# Patient Record
Sex: FEMALE | Race: WHITE | NOT HISPANIC OR LATINO | Employment: UNEMPLOYED | URBAN - METROPOLITAN AREA
[De-identification: names, ages, dates, MRNs, and addresses within clinical notes are randomized per-mention and may not be internally consistent; named-entity substitution may affect disease eponyms.]

---

## 2017-12-21 ENCOUNTER — HOSPITAL ENCOUNTER (EMERGENCY)
Facility: HOSPITAL | Age: 27
Discharge: HOME | End: 2017-12-21
Attending: EMERGENCY MEDICINE | Admitting: EMERGENCY MEDICINE

## 2017-12-21 VITALS
WEIGHT: 150 LBS | HEIGHT: 63 IN | OXYGEN SATURATION: 99 % | HEART RATE: 129 BPM | RESPIRATION RATE: 18 BRPM | SYSTOLIC BLOOD PRESSURE: 123 MMHG | TEMPERATURE: 98.1 F | BODY MASS INDEX: 26.58 KG/M2 | DIASTOLIC BLOOD PRESSURE: 61 MMHG

## 2017-12-21 DIAGNOSIS — N61.0 ACUTE MASTITIS OF RIGHT BREAST: Primary | ICD-10-CM

## 2017-12-21 PROCEDURE — 99283 EMERGENCY DEPT VISIT LOW MDM: CPT

## 2017-12-21 RX ORDER — CEPHALEXIN 500 MG/1
500 CAPSULE ORAL 4 TIMES DAILY
Qty: 40 CAPSULE | Refills: 0 | Status: SHIPPED | OUTPATIENT
Start: 2017-12-21 | End: 2017-12-31

## 2017-12-21 NOTE — ED PROVIDER NOTES
Subjective   Patient is a 27 y.o. female presenting with general illness.   History provided by:  Patient  Illness   Location:  Right breast   Quality:  Pain, redness, swelling   Duration:  1 day  Chronicity:  New  Context:  11 month old infant, mother breastfeeding   Ineffective treatments:  None   Associated symptoms: fever (subjective  ) and myalgias    No recent antibiotics or hx of mastitis. No drainage from nipple.     Review of Systems   Constitutional: Positive for chills and fever (subjective  ).   Musculoskeletal: Positive for myalgias.   Skin:        Right breast pain / redness   All other systems reviewed and are negative.      Past Medical History:   Diagnosis Date   • Disease of thyroid gland     HYPO        Allergies   Allergen Reactions   • Augmentin [Amoxicillin-Pot Clavulanate]        Past Surgical History:   Procedure Laterality Date   •  SECTION         Family History   Problem Relation Age of Onset   • Cervical cancer Mother        Social History     Social History   • Marital status:      Spouse name: N/A   • Number of children: N/A   • Years of education: N/A     Social History Main Topics   • Smoking status: Never Smoker   • Smokeless tobacco: None   • Alcohol use None      Comment: RARE   • Drug use: None   • Sexual activity: Not Asked     Other Topics Concern   • None     Social History Narrative           Objective   Physical Exam   Constitutional: She appears well-developed and well-nourished.   HENT:   Head: Atraumatic.   Eyes: Conjunctivae are normal.   Neck: Normal range of motion.   Cardiovascular: Regular rhythm.  Tachycardia present.    Pulmonary/Chest: Effort normal. No respiratory distress. Right breast exhibits tenderness. Right breast exhibits no mass and no nipple discharge. There is breast swelling (Erythema right lateral breast  ).   Musculoskeletal: Normal range of motion.   Neurological: She is alert.   Psychiatric: She has a normal mood and affect.   Nursing  "note and vitals reviewed.      Procedures         ED Course  ED Course      Discussed treatment plan with patient. Will Rx Keflex. Patient still breastfeeding. Encouraged continued feedings and warm compresses / expressions.     No results found for this or any previous visit (from the past 24 hour(s)).  Note: In addition to lab results from this visit, the labs listed above may include labs taken at another facility or during a different encounter within the last 24 hours. Please correlate lab times with ED admission and discharge times for further clarification of the services performed during this visit.    No orders to display     Vitals:    12/21/17 1036   BP: 123/61   BP Location: Left arm   Patient Position: Sitting   Pulse: (!) 129   Resp: 18   Temp: 98.1 °F (36.7 °C)   TempSrc: Oral   SpO2: 99%   Weight: 68 kg (150 lb)   Height: 160 cm (63\")     Medications - No data to display                        MDM    Final diagnoses:   Acute mastitis of right breast            ROMULO Jackman  12/21/17 1637    "

## 2018-02-06 ENCOUNTER — OFFICE VISIT (OUTPATIENT)
Dept: FAMILY MEDICINE CLINIC | Facility: CLINIC | Age: 28
End: 2018-02-06

## 2018-02-06 VITALS
HEIGHT: 63 IN | WEIGHT: 153.4 LBS | RESPIRATION RATE: 18 BRPM | DIASTOLIC BLOOD PRESSURE: 80 MMHG | BODY MASS INDEX: 27.18 KG/M2 | OXYGEN SATURATION: 98 % | SYSTOLIC BLOOD PRESSURE: 112 MMHG | HEART RATE: 100 BPM | TEMPERATURE: 97.8 F

## 2018-02-06 DIAGNOSIS — E03.8 HYPOTHYROIDISM DUE TO HASHIMOTO'S THYROIDITIS: Primary | ICD-10-CM

## 2018-02-06 DIAGNOSIS — R73.03 PREDIABETES: ICD-10-CM

## 2018-02-06 DIAGNOSIS — Z76.89 ENCOUNTER TO ESTABLISH CARE: ICD-10-CM

## 2018-02-06 DIAGNOSIS — R53.83 FATIGUE, UNSPECIFIED TYPE: ICD-10-CM

## 2018-02-06 DIAGNOSIS — E06.3 HYPOTHYROIDISM DUE TO HASHIMOTO'S THYROIDITIS: Primary | ICD-10-CM

## 2018-02-06 DIAGNOSIS — Z00.00 HEALTH CARE MAINTENANCE: ICD-10-CM

## 2018-02-06 LAB
ALBUMIN SERPL-MCNC: 4.3 G/DL (ref 3.2–4.8)
ALBUMIN/GLOB SERPL: 2 G/DL (ref 1.5–2.5)
ALP SERPL-CCNC: 82 U/L (ref 25–100)
ALT SERPL W P-5'-P-CCNC: 26 U/L (ref 7–40)
ANION GAP SERPL CALCULATED.3IONS-SCNC: 6 MMOL/L (ref 3–11)
AST SERPL-CCNC: 30 U/L (ref 0–33)
BASOPHILS # BLD AUTO: 0.01 10*3/MM3 (ref 0–0.2)
BASOPHILS NFR BLD AUTO: 0.1 % (ref 0–1)
BILIRUB SERPL-MCNC: 0.2 MG/DL (ref 0.3–1.2)
BUN BLD-MCNC: 15 MG/DL (ref 9–23)
BUN/CREAT SERPL: 30 (ref 7–25)
CALCIUM SPEC-SCNC: 9.3 MG/DL (ref 8.7–10.4)
CHLORIDE SERPL-SCNC: 105 MMOL/L (ref 99–109)
CO2 SERPL-SCNC: 26 MMOL/L (ref 20–31)
CREAT BLD-MCNC: 0.5 MG/DL (ref 0.6–1.3)
DEPRECATED RDW RBC AUTO: 47.1 FL (ref 37–54)
EOSINOPHIL # BLD AUTO: 0.13 10*3/MM3 (ref 0–0.3)
EOSINOPHIL NFR BLD AUTO: 1.9 % (ref 0–3)
ERYTHROCYTE [DISTWIDTH] IN BLOOD BY AUTOMATED COUNT: 14.8 % (ref 11.3–14.5)
GFR SERPL CREATININE-BSD FRML MDRD: 150 ML/MIN/1.73
GLOBULIN UR ELPH-MCNC: 2.1 GM/DL
GLUCOSE BLD-MCNC: 91 MG/DL (ref 70–100)
HBA1C MFR BLD: 5.4 % (ref 4.8–5.6)
HCT VFR BLD AUTO: 38.5 % (ref 34.5–44)
HGB BLD-MCNC: 12.5 G/DL (ref 11.5–15.5)
IMM GRANULOCYTES # BLD: 0.01 10*3/MM3 (ref 0–0.03)
IMM GRANULOCYTES NFR BLD: 0.1 % (ref 0–0.6)
LYMPHOCYTES # BLD AUTO: 2.93 10*3/MM3 (ref 0.6–4.8)
LYMPHOCYTES NFR BLD AUTO: 43.5 % (ref 24–44)
MCH RBC QN AUTO: 28.3 PG (ref 27–31)
MCHC RBC AUTO-ENTMCNC: 32.5 G/DL (ref 32–36)
MCV RBC AUTO: 87.1 FL (ref 80–99)
MONOCYTES # BLD AUTO: 0.77 10*3/MM3 (ref 0–1)
MONOCYTES NFR BLD AUTO: 11.4 % (ref 0–12)
NEUTROPHILS # BLD AUTO: 2.89 10*3/MM3 (ref 1.5–8.3)
NEUTROPHILS NFR BLD AUTO: 43 % (ref 41–71)
PLATELET # BLD AUTO: 211 10*3/MM3 (ref 150–450)
PMV BLD AUTO: 11.8 FL (ref 6–12)
POTASSIUM BLD-SCNC: 3.9 MMOL/L (ref 3.5–5.5)
PROT SERPL-MCNC: 6.4 G/DL (ref 5.7–8.2)
RBC # BLD AUTO: 4.42 10*6/MM3 (ref 3.89–5.14)
SODIUM BLD-SCNC: 137 MMOL/L (ref 132–146)
T4 FREE SERPL-MCNC: 0.89 NG/DL (ref 0.89–1.76)
TSH SERPL DL<=0.05 MIU/L-ACNC: 10.34 MIU/ML (ref 0.35–5.35)
WBC NRBC COR # BLD: 6.74 10*3/MM3 (ref 3.5–10.8)

## 2018-02-06 PROCEDURE — 83036 HEMOGLOBIN GLYCOSYLATED A1C: CPT | Performed by: NURSE PRACTITIONER

## 2018-02-06 PROCEDURE — 80050 GENERAL HEALTH PANEL: CPT | Performed by: NURSE PRACTITIONER

## 2018-02-06 PROCEDURE — 99203 OFFICE O/P NEW LOW 30 MIN: CPT | Performed by: NURSE PRACTITIONER

## 2018-02-06 PROCEDURE — 84439 ASSAY OF FREE THYROXINE: CPT | Performed by: NURSE PRACTITIONER

## 2018-02-06 NOTE — PROGRESS NOTES
"Subjective   Nicole Jose is a 27 y.o. female.   Ms. Jose presents today to establish care and for evaluation/management of her hypothyroidism. Patient states that she has been out of her Levothyroxine for several months and has not had a recent physical exam.    Hypothyroidism   This is a chronic (since age 18) problem. The problem occurs daily. The problem has been gradually worsening. Associated symptoms include fatigue. Pertinent negatives include no abdominal pain, anorexia, arthralgias, chest pain, chills, diaphoresis, fever, headaches, myalgias, neck pain, rash, sore throat or swollen glands. Nothing aggravates the symptoms. Treatments tried: Levothyroxine (previously on 75mcg daily) The treatment provided moderate relief.       The following portions of the patient's history were reviewed and updated as appropriate: allergies, current medications, past family history, past medical history, past social history, past surgical history and problem list.     Allergies   Allergen Reactions   • Augmentin [Amoxicillin-Pot Clavulanate]      Review of Systems   Constitutional: Positive for fatigue. Negative for activity change, appetite change, chills, diaphoresis, fever and unexpected weight change.   HENT: Negative.  Negative for sore throat.    Respiratory: Negative.    Cardiovascular: Positive for palpitations (occasional). Negative for chest pain and leg swelling.   Gastrointestinal: Negative.  Negative for abdominal pain and anorexia.   Genitourinary: Negative.  Menstrual problem: heavy menses.   Musculoskeletal: Negative for arthralgias, myalgias and neck pain.   Skin: Negative for rash.        C/o dry skin, hair \"falling out\".   Allergic/Immunologic: Negative.    Neurological: Negative.  Negative for headaches.   Hematological: Negative.    Psychiatric/Behavioral: Negative.        Objective   Physical Exam   Constitutional: She is oriented to person, place, and time. Vital signs are normal. She appears " well-developed and well-nourished. She is cooperative. No distress.   HENT:   Head: Normocephalic and atraumatic.   Right Ear: Tympanic membrane and external ear normal.   Left Ear: Tympanic membrane and external ear normal.   Nose: Mucosal edema present.   Mouth/Throat: Uvula is midline, oropharynx is clear and moist and mucous membranes are normal.   Neck: Normal range of motion. Neck supple. No thyromegaly present.   Cardiovascular: Normal rate, regular rhythm and normal heart sounds.    Pulmonary/Chest: Effort normal and breath sounds normal.   Lymphadenopathy:     She has no cervical adenopathy.   Neurological: She is alert and oriented to person, place, and time.   Skin: Skin is warm, dry and intact. No rash noted. She is not diaphoretic.   Psychiatric: She has a normal mood and affect. Her behavior is normal. Judgment and thought content normal.   Vitals reviewed.    Vitals:    02/06/18 0842   BP: 112/80   Pulse: 100   Resp: 18   Temp: 97.8 °F (36.6 °C)   SpO2: 98%   Body mass index is 27.17 kg/(m^2).     Assessment/Plan   Nicole was seen today for establish care and hypothyroidism.    Diagnoses and all orders for this visit:    Hypothyroidism due to Hashimoto's thyroiditis  -     TSH  -     T4, Free  -     Comprehensive Metabolic Panel  -     CBC Auto Differential  -     Hemoglobin A1c    Prediabetes  -     TSH  -     T4, Free  -     Comprehensive Metabolic Panel  -     CBC Auto Differential  -     Hemoglobin A1c    Fatigue, unspecified type  -     TSH  -     T4, Free  -     Comprehensive Metabolic Panel  -     CBC Auto Differential  -     Hemoglobin A1c    Encounter to establish care  -     TSH  -     T4, Free  -     Comprehensive Metabolic Panel  -     CBC Auto Differential  -     Hemoglobin A1c    Health care maintenance  -     TSH  -     T4, Free  -     Comprehensive Metabolic Panel  -     CBC Auto Differential  -     Hemoglobin A1c      Discussed the nature of the medical condition(s) risks,  complications, implications, management, safe and proper use of medications. Encouraged medication compliance, and keeping scheduled follow up appointments with me and any other providers.   The patient is here for a health maintenance visit.  Currently, the patient consumes a healthy diet and has an adequate exercise regimen. Screening lab work is ordered.  Immunizations are declined today and vaccine education is provided.  Advice and education is given regarding nutrition, aerobic exercise, routine dental evaluations, routine eye exams, reproductive health, cardiovascular risk reduction, sunscreen use, self skin examination (annual dermatology evaluations) and seat belt use (general overall safety).  Further recommendations after lab evaluation.  Annual wellness evaluations recommended.

## 2018-02-06 NOTE — PATIENT INSTRUCTIONS
Hypothyroidism  Hypothyroidism is a disorder of the thyroid. The thyroid is a large gland that is located in the lower front of the neck. The thyroid releases hormones that control how the body works. With hypothyroidism, the thyroid does not make enough of these hormones.  What are the causes?  Causes of hypothyroidism may include:  · Viral infections.  · Pregnancy.  · Your own defense system (immune system) attacking your thyroid.  · Certain medicines.  · Birth defects.  · Past radiation treatments to your head or neck.  · Past treatment with radioactive iodine.  · Past surgical removal of part or all of your thyroid.  · Problems with the gland that is located in the center of your brain (pituitary).  What are the signs or symptoms?  Signs and symptoms of hypothyroidism may include:  · Feeling as though you have no energy (lethargy).  · Inability to tolerate cold.  · Weight gain that is not explained by a change in diet or exercise habits.  · Dry skin.  · Coarse hair.  · Menstrual irregularity.  · Slowing of thought processes.  · Constipation.  · Sadness or depression.  How is this diagnosed?  Your health care provider may diagnose hypothyroidism with blood tests and ultrasound tests.  How is this treated?  Hypothyroidism is treated with medicine that replaces the hormones that your body does not make. After you begin treatment, it may take several weeks for symptoms to go away.  Follow these instructions at home:  · Take medicines only as directed by your health care provider.  · If you start taking any new medicines, tell your health care provider.  · Keep all follow-up visits as directed by your health care provider. This is important. As your condition improves, your dosage needs may change. You will need to have blood tests regularly so that your health care provider can watch your condition.  Contact a health care provider if:  · Your symptoms do not get better with treatment.  · You are taking thyroid  replacement medicine and:  ¨ You sweat excessively.  ¨ You have tremors.  ¨ You feel anxious.  ¨ You lose weight rapidly.  ¨ You cannot tolerate heat.  ¨ You have emotional swings.  ¨ You have diarrhea.  ¨ You feel weak.  Get help right away if:  · You develop chest pain.  · You develop an irregular heartbeat.  · You develop a rapid heartbeat.  This information is not intended to replace advice given to you by your health care provider. Make sure you discuss any questions you have with your health care provider.  Document Released: 12/18/2006 Document Revised: 05/25/2017 Document Reviewed: 05/05/2015  [employer] Interactive Patient Education © 2017 [employer]    Fatigue  Introduction  Fatigue is feeling tired all of the time, a lack of energy, or a lack of motivation. Occasional or mild fatigue is often a normal response to activity or life in general. However, long-lasting (chronic) or extreme fatigue may indicate an underlying medical condition.  Follow these instructions at home:  Watch your fatigue for any changes. The following actions may help to lessen any discomfort you are feeling:  · Talk to your health care provider about how much sleep you need each night. Try to get the required amount every night.  · Take medicines only as directed by your health care provider.  · Eat a healthy and nutritious diet. Ask your health care provider if you need help changing your diet.  · Drink enough fluid to keep your urine clear or pale yellow.  · Practice ways of relaxing, such as yoga, meditation, massage therapy, or acupuncture.  · Exercise regularly.  · Change situations that cause you stress. Try to keep your work and personal routine reasonable.  · Do not abuse illegal drugs.  · Limit alcohol intake to no more than 1 drink per day for nonpregnant women and 2 drinks per day for men. One drink equals 12 ounces of beer, 5 ounces of wine, or 1½ ounces of hard liquor.  · Take a multivitamin, if directed by your health  care provider.  Contact a health care provider if:  · Your fatigue does not get better.  · You have a fever.  · You have unintentional weight loss or gain.  · You have headaches.  · You have difficulty:  ¨ Falling asleep.  ¨ Sleeping throughout the night.  · You feel angry, guilty, anxious, or sad.  · You are unable to have a bowel movement (constipation).  · You skin is dry.  · Your legs or another part of your body is swollen.  Get help right away if:  · You feel confused.  · Your vision is blurry.  · You feel faint or pass out.  · You have a severe headache.  · You have severe abdominal, pelvic, or back pain.  · You have chest pain, shortness of breath, or an irregular or fast heartbeat.  · You are unable to urinate or you urinate less than normal.  · You develop abnormal bleeding, such as bleeding from the rectum, vagina, nose, lungs, or nipples.  · You vomit blood.  · You have thoughts about harming yourself or committing suicide.  · You are worried that you might harm someone else.  This information is not intended to replace advice given to you by your health care provider. Make sure you discuss any questions you have with your health care provider.  Document Released: 10/14/2008 Document Revised: 05/25/2017 Document Reviewed: 04/21/2015  © 2017 [employer]    Hyperglycemia  Hyperglycemia occurs when the level of sugar (glucose) in the blood is too high. Glucose is a type of sugar that provides the body's main source of energy. Certain hormones (insulin and glucagon) control the level of glucose in the blood. Insulin lowers blood glucose, and glucagon increases blood glucose. Hyperglycemia can result from having too little insulin in the bloodstream, or from the body not responding normally to insulin.  Hyperglycemia occurs most often in people who have diabetes (diabetes mellitus), but it can happen in people who do not have diabetes. It can develop quickly, and it can be life-threatening if it causes you to  become severely dehydrated (diabetic ketoacidosis or hyperglycemic hyperosmolar state). Severe hyperglycemia is a medical emergency.  What are the causes?  If you have diabetes, hyperglycemia may be caused by:  · Diabetes medicine.  · Medicines that increase blood glucose or affect your diabetes control.  · Not eating enough, or not eating often enough.  · Changes in physical activity level.  · Being sick or having an infection.  If you have prediabetes or undiagnosed diabetes:  · Hyperglycemia may be caused by those conditions.  If you do not have diabetes, hyperglycemia may be caused by:  · Certain medicines, including steroid medicines, beta-blockers, epinephrine, and thiazide diuretics.  · Stress.  · Serious illness.  · Surgery.  · Diseases of the pancreas.  · Infection.  What increases the risk?  Hyperglycemia is more likely to develop in people who have risk factors for diabetes, such as:  · Having a family member with diabetes.  · Having a gene for type 1 diabetes that is passed from parent to child (inherited).  · Living in an area with cold weather conditions.  · Exposure to certain viruses.  · Certain conditions in which the body's disease-fighting (immune) system attacks itself (autoimmune disorders).  · Being overweight or obese.  · Having an inactive (sedentary) lifestyle.  · Having been diagnosed with insulin resistance.  · Having a history of prediabetes, gestational diabetes, or polycystic ovarian syndrome (PCOS).  · Being of American-[place], -American, /, or / descent.  What are the signs or symptoms?  Hyperglycemia may not cause any symptoms. If you do have symptoms, they may include early warning signs, such as:  · Increased thirst.  · Hunger.  · Feeling very tired.  · Needing to urinate more often than usual.  · Blurry vision.  Other symptoms may develop if hyperglycemia gets worse, such as:  · Dry mouth.  · Loss of appetite.  · Fruity-smelling  breath.  · Weakness.  · Unexpected or rapid weight gain or weight loss.  · Tingling or numbness in the hands or feet.  · Headache.  · Skin that does not quickly return to normal after being lightly pinched and released (poor skin turgor).  · Abdominal pain.  · Cuts or bruises that are slow to heal.  How is this diagnosed?  Hyperglycemia is diagnosed with a blood test to measure your blood glucose level. This blood test is usually done while you are having symptoms. Your health care provider may also do a physical exam and review your medical history.  You may have more tests to determine the cause of your hyperglycemia, such as:  · A fasting blood glucose (FBG) test. You will not be allowed to eat (you will fast) for at least 8 hours before a blood sample is taken.  · An A1c (hemoglobin A1c) blood test. This provides information about blood glucose control over the previous 2-3 months.  · An oral glucose tolerance test (OGTT). This measures your blood glucose at two times:  ¨ After fasting. This is your baseline blood glucose level.  ¨ Two hours after drinking a beverage that contains glucose.  How is this treated?  Treatment depends on the cause of your hyperglycemia. Treatment may include:  · Taking medicine to regulate your blood glucose levels. If you take insulin or other diabetes medicines, your medicine or dosage may be adjusted.  · Lifestyle changes, such as exercising more, eating healthier foods, or losing weight.  · Treating an illness or infection, if this caused your hyperglycemia.  · Checking your blood glucose more often.  · Stopping or reducing steroid medicines, if these caused your hyperglycemia.  If your hyperglycemia becomes severe and it results in hyperglycemic hyperosmolar state, you must be hospitalized and given IV fluids.  Follow these instructions at home:  General instructions  · Take over-the-counter and prescription medicines only as told by your health care provider.  · Do not use any  products that contain nicotine or tobacco, such as cigarettes and e-cigarettes. If you need help quitting, ask your health care provider.  · Limit alcohol intake to no more than 1 drink per day for nonpregnant women and 2 drinks per day for men. One drink equals 12 oz of beer, 5 oz of wine, or 1½ oz of hard liquor.  · Learn to manage stress. If you need help with this, ask your health care provider.  · Keep all follow-up visits as told by your health care provider. This is important.  Eating and drinking  · Maintain a healthy weight.  · Exercise regularly, as directed by your health care provider.  · Stay hydrated, especially when you exercise, get sick, or spend time in hot temperatures.  · Eat healthy foods, such as:  ¨ Lean proteins.  ¨ Complex carbohydrates.  ¨ Fresh fruits and vegetables.  ¨ Low-fat dairy products.  ¨ Healthy fats.  · Drink enough fluid to keep your urine clear or pale yellow.  If you have diabetes:   · Make sure you know the symptoms of hyperglycemia.  · Follow your diabetes management plan, as told by your health care provider. Make sure you:  ¨ Take your insulin and medicines as directed.  ¨ Follow your exercise plan.  ¨ Follow your meal plan. Eat on time, and do not skip meals.  ¨ Check your blood glucose as often as directed. Make sure to check your blood glucose before and after exercise. If you exercise longer or in a different way than usual, check your blood glucose more often.  ¨ Follow your sick day plan whenever you cannot eat or drink normally. Make this plan in advance with your health care provider.  · Share your diabetes management plan with people in your workplace, school, and household.  · Check your urine for ketones when you are ill and as told by your health care provider.  · Carry a medical alert card or wear medical alert jewelry.  Contact a health care provider if:  · Your blood glucose is at or above 240 mg/dL (13.3 mmol/L) for 2 days in a row.  · You have problems  keeping your blood glucose in your target range.  · You have frequent episodes of hyperglycemia.  Get help right away if:  · You have difficulty breathing.  · You have a change in how you think, feel, or act (mental status).  · You have nausea or vomiting that does not go away.  These symptoms may represent a serious problem that is an emergency. Do not wait to see if the symptoms will go away. Get medical help right away. Call your local emergency services (911 in the [place] Do not drive yourself to the hospital.   Summary  · Hyperglycemia occurs when the level of sugar (glucose) in the blood is too high.  · Hyperglycemia is diagnosed with a blood test to measure your blood glucose level. This blood test is usually done while you are having symptoms. Your health care provider may also do a physical exam and review your medical history.  · If you have diabetes, follow your diabetes management plan as told by your health care provider.  · Contact your health care provider if you have problems keeping your blood glucose in your target range.  This information is not intended to replace advice given to you by your health care provider. Make sure you discuss any questions you have with your health care provider.  Document Released: 06/13/2002 Document Revised: 09/04/2017 Document Reviewed: 09/04/2017  [employer] Interactive Patient Education © 2017 [employer]  Levothyroxine tablets  What is this medicine?  LEVOTHYROXINE (moisés voe thye ROBER een) is a thyroid hormone. This medicine can improve symptoms of thyroid deficiency such as slow speech, lack of energy, weight gain, hair loss, dry skin, and feeling cold. It also helps to treat goiter (an enlarged thyroid gland). It is also used to treat some kinds of thyroid cancer along with surgery and other medicines.  This medicine may be used for other purposes; ask your health care provider or pharmacist if you have questions.  COMMON BRAND NAME(S): Estre, Levo-T, Levothroid,  Levoxyl, Synthroid, Thyro-Tabs, Unithroid  What should I tell my health care provider before I take this medicine?  They need to know if you have any of these conditions:  -angina  -blood clotting problems  -diabetes  -dieting or on a weight loss program  -fertility problems  -heart disease  -high levels of thyroid hormone  -pituitary gland problem  -previous heart attack  -an unusual or allergic reaction to levothyroxine, thyroid hormones, other medicines, foods, dyes, or preservatives  -pregnant or trying to get pregnant  -breast-feeding  How should I use this medicine?  Take this medicine by mouth with plenty of water. It is best to take on an empty stomach, at least 30 minutes before or 2 hours after food. Follow the directions on the prescription label. Take at the same time each day. Do not take your medicine more often than directed.  Contact your pediatrician regarding the use of this medicine in children. While this drug may be prescribed for children and infants as young as a few days of age for selected conditions, precautions do apply. For infants, you may crush the tablet and place in a small amount of (5-10 ml or 1 to 2 teaspoonfuls) of water, breast milk, or non-soy based infant formula. Do not mix with soy-based infant formula. Give as directed.  Overdosage: If you think you have taken too much of this medicine contact a poison control center or emergency room at once.  NOTE: This medicine is only for you. Do not share this medicine with others.  What if I miss a dose?  If you miss a dose, take it as soon as you can. If it is almost time for your next dose, take only that dose. Do not take double or extra doses.  What may interact with this medicine?  -amiodarone  -antacids  -anti-thyroid medicines  -calcium supplements  -carbamazepine  -cholestyramine  -colestipol  -digoxin  -female hormones, including contraceptive or birth control pills  -iron supplements  -ketamine  -liquid nutrition products  like Ensure  -medicines for colds and breathing difficulties  -medicines for diabetes  -medicines for mental depression  -medicines or herbals used to decrease weight or appetite  -phenobarbital or other barbiturate medications  -phenytoin  -prednisone or other corticosteroids  -rifabutin  -rifampin  -soy isoflavones  -sucralfate  -theophylline  -warfarin  This list may not describe all possible interactions. Give your health care provider a list of all the medicines, herbs, non-prescription drugs, or dietary supplements you use. Also tell them if you smoke, drink alcohol, or use illegal drugs. Some items may interact with your medicine.  What should I watch for while using this medicine?  Be sure to take this medicine with plenty of fluids. Some tablets may cause choking, gagging, or difficulty swallowing from the tablet getting stuck in your throat. Most of these problems disappear if the medicine is taken with the right amount of water or other fluids.  Do not switch brands of this medicine unless your health care professional agrees with the change. Ask questions if you are uncertain.  You will need regular exams and occasional blood tests to check the response to treatment. If you are receiving this medicine for an underactive thyroid, it may be several weeks before you notice an improvement. Check with your doctor or health care professional if your symptoms do not improve.  It may be necessary for you to take this medicine for the rest of your life. Do not stop using this medicine unless your doctor or health care professional advises you to.  This medicine can affect blood sugar levels. If you have diabetes, check your blood sugar as directed.  You may lose some of your hair when you first start treatment. With time, this usually corrects itself.  If you are going to have surgery, tell your doctor or health care professional that you are taking this medicine.  What side effects may I notice from receiving  this medicine?  Side effects that you should report to your doctor or health care professional as soon as possible:  -allergic reactions like skin rash, itching or hives, swelling of the face, lips, or tongue  -chest pain  -excessive sweating or intolerance to heat  -fast or irregular heartbeat  -nervousness  -skin rash or hives  -swelling of ankles, feet, or legs  -tremors  Side effects that usually do not require medical attention (report to your doctor or health care professional if they continue or are bothersome):  -changes in appetite  -changes in menstrual periods  -diarrhea  -hair loss  -headache  -trouble sleeping  -weight loss  This list may not describe all possible side effects. Call your doctor for medical advice about side effects. You may report side effects to FDA at 6-407-FDA-7174.  Where should I keep my medicine?  Keep out of the reach of children.  Store at room temperature between 15 and 30 degrees C (59 and 86 degrees F). Protect from light and moisture. Keep container tightly closed. Throw away any unused medicine after the expiration date.  NOTE: This sheet is a summary. It may not cover all possible information. If you have questions about this medicine, talk to your doctor, pharmacist, or health care provider.  © 2018 [employer] (2010-03-26 14:28:07)  Health Maintenance, Female  Introduction  Adopting a healthy lifestyle and getting preventive care can go a long way to promote health and wellness. Talk with your health care provider about what schedule of regular examinations is right for you. This is a good chance for you to check in with your provider about disease prevention and staying healthy.  In between checkups, there are plenty of things you can do on your own. Experts have done a lot of research about which lifestyle changes and preventive measures are most likely to keep you healthy. Ask your health care provider for more information.  Weight and diet  Eat a healthy  diet  · Be sure to include plenty of vegetables, fruits, low-fat dairy products, and lean protein.  · Do not eat a lot of foods high in solid fats, added sugars, or salt.  · Get regular exercise. This is one of the most important things you can do for your health.  ¨ Most adults should exercise for at least 150 minutes each week. The exercise should increase your heart rate and make you sweat (moderate-intensity exercise).  ¨ Most adults should also do strengthening exercises at least twice a week. This is in addition to the moderate-intensity exercise.  Maintain a healthy weight  · Body mass index (BMI) is a measurement that can be used to identify possible weight problems. It estimates body fat based on height and weight. Your health care provider can help determine your BMI and help you achieve or maintain a healthy weight.  · For females 20 years of age and older:  ¨ A BMI below 18.5 is considered underweight.  ¨ A BMI of 18.5 to 24.9 is normal.  ¨ A BMI of 25 to 29.9 is considered overweight.  ¨ A BMI of 30 and above is considered obese.  Watch levels of cholesterol and blood lipids  · You should start having your blood tested for lipids and cholesterol at 20 years of age, then have this test every 5 years.  · You may need to have your cholesterol levels checked more often if:  ¨ Your lipid or cholesterol levels are high.  ¨ You are older than 50 years of age.  ¨ You are at high risk for heart disease.  Cancer screening  Lung Cancer  · Lung cancer screening is recommended for adults 55-80 years old who are at high risk for lung cancer because of a history of smoking.  · A yearly low-dose CT scan of the lungs is recommended for people who:  ¨ Currently smoke.  ¨ Have quit within the past 15 years.  ¨ Have at least a 30-pack-year history of smoking. A pack year is smoking an average of one pack of cigarettes a day for 1 year.  · Yearly screening should continue until it has been 15 years since you quit.  · Yearly  screening should stop if you develop a health problem that would prevent you from having lung cancer treatment.  Breast Cancer  · Practice breast self-awareness. This means understanding how your breasts normally appear and feel.  · It also means doing regular breast self-exams. Let your health care provider know about any changes, no matter how small.  · If you are in your 20s or 30s, you should have a clinical breast exam (CBE) by a health care provider every 1-3 years as part of a regular health exam.  · If you are 40 or older, have a CBE every year. Also consider having a breast X-ray (mammogram) every year.  · If you have a family history of breast cancer, talk to your health care provider about genetic screening.  · If you are at high risk for breast cancer, talk to your health care provider about having an MRI and a mammogram every year.  · Breast cancer gene (BRCA) assessment is recommended for women who have family members with BRCA-related cancers. BRCA-related cancers include:  ¨ Breast.  ¨ Ovarian.  ¨ Tubal.  ¨ Peritoneal cancers.  · Results of the assessment will determine the need for genetic counseling and BRCA1 and BRCA2 testing.  Cervical Cancer   Your health care provider may recommend that you be screened regularly for cancer of the pelvic organs (ovaries, uterus, and vagina). This screening involves a pelvic examination, including checking for microscopic changes to the surface of your cervix (Pap test). You may be encouraged to have this screening done every 3 years, beginning at age 21.  · For women ages 30-65, health care providers may recommend pelvic exams and Pap testing every 3 years, or they may recommend the Pap and pelvic exam, combined with testing for human papilloma virus (HPV), every 5 years. Some types of HPV increase your risk of cervical cancer. Testing for HPV may also be done on women of any age with unclear Pap test results.  · Other health care providers may not recommend any  screening for nonpregnant women who are considered low risk for pelvic cancer and who do not have symptoms. Ask your health care provider if a screening pelvic exam is right for you.  · If you have had past treatment for cervical cancer or a condition that could lead to cancer, you need Pap tests and screening for cancer for at least 20 years after your treatment. If Pap tests have been discontinued, your risk factors (such as having a new sexual partner) need to be reassessed to determine if screening should resume. Some women have medical problems that increase the chance of getting cervical cancer. In these cases, your health care provider may recommend more frequent screening and Pap tests.  Colorectal Cancer  · This type of cancer can be detected and often prevented.  · Routine colorectal cancer screening usually begins at 50 years of age and continues through 75 years of age.  · Your health care provider may recommend screening at an earlier age if you have risk factors for colon cancer.  · Your health care provider may also recommend using home test kits to check for hidden blood in the stool.  · A small camera at the end of a tube can be used to examine your colon directly (sigmoidoscopy or colonoscopy). This is done to check for the earliest forms of colorectal cancer.  · Routine screening usually begins at age 50.  · Direct examination of the colon should be repeated every 5-10 years through 75 years of age. However, you may need to be screened more often if early forms of precancerous polyps or small growths are found.  Skin Cancer  · Check your skin from head to toe regularly.  · Tell your health care provider about any new moles or changes in moles, especially if there is a change in a mole's shape or color.  · Also tell your health care provider if you have a mole that is larger than the size of a pencil eraser.  · Always use sunscreen. Apply sunscreen liberally and repeatedly throughout the  day.  · Protect yourself by wearing long sleeves, pants, a wide-brimmed hat, and sunglasses whenever you are outside.  Heart disease, diabetes, and high blood pressure  · High blood pressure causes heart disease and increases the risk of stroke. High blood pressure is more likely to develop in:  ¨ People who have blood pressure in the high end of the normal range (130-139/85-89 mm Hg).  ¨ People who are overweight or obese.  ¨ People who are .  · If you are 18-39 years of age, have your blood pressure checked every 3-5 years. If you are 40 years of age or older, have your blood pressure checked every year. You should have your blood pressure measured twice--once when you are at a hospital or clinic, and once when you are not at a hospital or clinic. Record the average of the two measurements. To check your blood pressure when you are not at a hospital or clinic, you can use:  ¨ An automated blood pressure machine at a pharmacy.  ¨ A home blood pressure monitor.  · If you are between 55 years and 79 years old, ask your health care provider if you should take aspirin to prevent strokes.  · Have regular diabetes screenings. This involves taking a blood sample to check your fasting blood sugar level.  ¨ If you are at a normal weight and have a low risk for diabetes, have this test once every three years after 45 years of age.  ¨ If you are overweight and have a high risk for diabetes, consider being tested at a younger age or more often.  Preventing infection  Hepatitis B  · If you have a higher risk for hepatitis B, you should be screened for this virus. You are considered at high risk for hepatitis B if:  ¨ You were born in a country where hepatitis B is common. Ask your health care provider which countries are considered high risk.  ¨ Your parents were born in a high-risk country, and you have not been immunized against hepatitis B (hepatitis B vaccine).  ¨ You have HIV or AIDS.  ¨ You use needles to  inject street drugs.  ¨ You live with someone who has hepatitis B.  ¨ You have had sex with someone who has hepatitis B.  ¨ You get hemodialysis treatment.  ¨ You take certain medicines for conditions, including cancer, organ transplantation, and autoimmune conditions.  Hepatitis C  · Blood testing is recommended for:  ¨ Everyone born from 1945 through 1965.  ¨ Anyone with known risk factors for hepatitis C.  Sexually transmitted infections (STIs)  · You should be screened for sexually transmitted infections (STIs) including gonorrhea and chlamydia if:  ¨ You are sexually active and are younger than 24 years of age.  ¨ You are older than 24 years of age and your health care provider tells you that you are at risk for this type of infection.  ¨ Your sexual activity has changed since you were last screened and you are at an increased risk for chlamydia or gonorrhea. Ask your health care provider if you are at risk.  · If you do not have HIV, but are at risk, it may be recommended that you take a prescription medicine daily to prevent HIV infection. This is called pre-exposure prophylaxis (PrEP). You are considered at risk if:  ¨ You are sexually active and do not regularly use condoms or know the HIV status of your partner(s).  ¨ You take drugs by injection.  ¨ You are sexually active with a partner who has HIV.  Talk with your health care provider about whether you are at high risk of being infected with HIV. If you choose to begin PrEP, you should first be tested for HIV. You should then be tested every 3 months for as long as you are taking PrEP.  Pregnancy  · If you are premenopausal and you may become pregnant, ask your health care provider about preconception counseling.  · If you may become pregnant, take 400 to 800 micrograms (mcg) of folic acid every day.  · If you want to prevent pregnancy, talk to your health care provider about birth control (contraception).  Osteoporosis and menopause  · Osteoporosis is a  disease in which the bones lose minerals and strength with aging. This can result in serious bone fractures. Your risk for osteoporosis can be identified using a bone density scan.  · If you are 65 years of age or older, or if you are at risk for osteoporosis and fractures, ask your health care provider if you should be screened.  · Ask your health care provider whether you should take a calcium or vitamin D supplement to lower your risk for osteoporosis.  · Menopause may have certain physical symptoms and risks.  · Hormone replacement therapy may reduce some of these symptoms and risks.  Talk to your health care provider about whether hormone replacement therapy is right for you.  Follow these instructions at home:  · Schedule regular health, dental, and eye exams.  · Stay current with your immunizations.  · Do not use any tobacco products including cigarettes, chewing tobacco, or electronic cigarettes.  · If you are pregnant, do not drink alcohol.  · If you are breastfeeding, limit how much and how often you drink alcohol.  · Limit alcohol intake to no more than 1 drink per day for nonpregnant women. One drink equals 12 ounces of beer, 5 ounces of wine, or 1½ ounces of hard liquor.  · Do not use street drugs.  · Do not share needles.  · Ask your health care provider for help if you need support or information about quitting drugs.  · Tell your health care provider if you often feel depressed.  · Tell your health care provider if you have ever been abused or do not feel safe at home.  This information is not intended to replace advice given to you by your health care provider. Make sure you discuss any questions you have with your health care provider.  Document Released: 07/02/2012 Document Revised: 05/25/2017 Document Reviewed: 09/20/2016  © 2017 [employer]    Preventive Care 18-39 Years, Female  Preventive care refers to lifestyle choices and visits with your health care provider that can promote health and  wellness.  What does preventive care include?  · A yearly physical exam. This is also called an annual well check.  · Dental exams once or twice a year.  · Routine eye exams. Ask your health care provider how often you should have your eyes checked.  · Personal lifestyle choices, including:  ¨ Daily care of your teeth and gums.  ¨ Regular physical activity.  ¨ Eating a healthy diet.  ¨ Avoiding tobacco and drug use.  ¨ Limiting alcohol use.  ¨ Practicing safe sex.  ¨ Taking vitamin and mineral supplements as recommended by your health care provider.  What happens during an annual well check?  The services and screenings done by your health care provider during your annual well check will depend on your age, overall health, lifestyle risk factors, and family history of disease.  Counseling   Your health care provider may ask you questions about your:  · Alcohol use.  · Tobacco use.  · Drug use.  · Emotional well-being.  · Home and relationship well-being.  · Sexual activity.  · Eating habits.  · Work and work environment.  · Method of birth control.  · Menstrual cycle.  · Pregnancy history.  Screening   You may have the following tests or measurements:  · Height, weight, and BMI.  · Diabetes screening. This is done by checking your blood sugar (glucose) after you have not eaten for a while (fasting).  · Blood pressure.  · Lipid and cholesterol levels. These may be checked every 5 years starting at age 20.  · Skin check.  · Hepatitis C blood test.  · Hepatitis B blood test.  · Sexually transmitted disease (STD) testing.  · BRCA-related cancer screening. This may be done if you have a family history of breast, ovarian, tubal, or peritoneal cancers.  · Pelvic exam and Pap test. This may be done every 3 years starting at age 21. Starting at age 30, this may be done every 5 years if you have a Pap test in combination with an HPV test.  Discuss your test results, treatment options, and if necessary, the need for more tests  with your health care provider.  Vaccines   Your health care provider may recommend certain vaccines, such as:  · Influenza vaccine. This is recommended every year.  · Tetanus, diphtheria, and acellular pertussis (Tdap, Td) vaccine. You may need a Td booster every 10 years.  · Varicella vaccine. You may need this if you have not been vaccinated.  · HPV vaccine. If you are 26 or younger, you may need three doses over 6 months.  · Measles, mumps, and rubella (MMR) vaccine. You may need at least one dose of MMR. You may also need a second dose.  · Pneumococcal 13-valent conjugate (PCV13) vaccine. You may need this if you have certain conditions and were not previously vaccinated.  · Pneumococcal polysaccharide (PPSV23) vaccine. You may need one or two doses if you smoke cigarettes or if you have certain conditions.  · Meningococcal vaccine. One dose is recommended if you are age 19-21 years and a first-year college student living in a [place], or if you have one of several medical conditions. You may also need additional booster doses.  · Hepatitis A vaccine. You may need this if you have certain conditions or if you travel or work in places where you may be exposed to hepatitis A.  · Hepatitis B vaccine. You may need this if you have certain conditions or if you travel or work in places where you may be exposed to hepatitis B.  · Haemophilus influenzae type b (Hib) vaccine. You may need this if you have certain risk factors.  Talk to your health care provider about which screenings and vaccines you need and how often you need them.  This information is not intended to replace advice given to you by your health care provider. Make sure you discuss any questions you have with your health care provider.  Document Released: 02/13/2003 Document Revised: 09/06/2017 Document Reviewed: 10/18/2016  [employer] Interactive Patient Education © 2017 [employer]

## 2018-02-11 DIAGNOSIS — E03.8 HYPOTHYROIDISM DUE TO HASHIMOTO'S THYROIDITIS: Primary | ICD-10-CM

## 2018-02-11 DIAGNOSIS — E06.3 HYPOTHYROIDISM DUE TO HASHIMOTO'S THYROIDITIS: Primary | ICD-10-CM

## 2018-02-11 RX ORDER — LEVOTHYROXINE SODIUM 0.07 MG/1
75 TABLET ORAL DAILY
Qty: 30 TABLET | Refills: 1 | Status: SHIPPED | OUTPATIENT
Start: 2018-02-11 | End: 2018-08-02 | Stop reason: SDUPTHER

## 2018-08-02 ENCOUNTER — OFFICE VISIT (OUTPATIENT)
Dept: FAMILY MEDICINE CLINIC | Facility: CLINIC | Age: 28
End: 2018-08-02

## 2018-08-02 VITALS
WEIGHT: 153 LBS | BODY MASS INDEX: 27.11 KG/M2 | HEART RATE: 90 BPM | HEIGHT: 63 IN | SYSTOLIC BLOOD PRESSURE: 98 MMHG | TEMPERATURE: 98.2 F | DIASTOLIC BLOOD PRESSURE: 66 MMHG | OXYGEN SATURATION: 97 % | RESPIRATION RATE: 18 BRPM

## 2018-08-02 DIAGNOSIS — E06.3 HYPOTHYROIDISM DUE TO HASHIMOTO'S THYROIDITIS: Primary | ICD-10-CM

## 2018-08-02 DIAGNOSIS — E03.8 HYPOTHYROIDISM DUE TO HASHIMOTO'S THYROIDITIS: Primary | ICD-10-CM

## 2018-08-02 LAB — TSH SERPL DL<=0.05 MIU/L-ACNC: 7.96 MIU/ML (ref 0.35–5.35)

## 2018-08-02 PROCEDURE — 99214 OFFICE O/P EST MOD 30 MIN: CPT | Performed by: NURSE PRACTITIONER

## 2018-08-02 PROCEDURE — 84443 ASSAY THYROID STIM HORMONE: CPT | Performed by: NURSE PRACTITIONER

## 2018-08-02 RX ORDER — LEVOTHYROXINE SODIUM 0.07 MG/1
75 TABLET ORAL DAILY
Qty: 30 TABLET | Refills: 1 | Status: SHIPPED | OUTPATIENT
Start: 2018-08-02 | End: 2018-10-02 | Stop reason: SDUPTHER

## 2018-08-02 NOTE — PROGRESS NOTES
Subjective   Nicole Jose is a 27 y.o. female.   Ms. Jose presents today for re-evaluation/management of hypothyroidism. Patient states that she has recently returned from a trip to [place] and while she was away she ran out of her thyroid medication. States has been out of her medication for 2 months-c/o her hair falling out now.    Hypothyroidism   This is a chronic problem. The current episode started more than 1 year ago. The problem occurs daily. The problem has been gradually worsening. Associated symptoms include fatigue. Pertinent negatives include no abdominal pain, anorexia, arthralgias, chest pain, chills, diaphoresis, fever, headaches, myalgias, nausea, neck pain, rash, sore throat, swollen glands or vomiting. Nothing aggravates the symptoms. Treatments tried: Levothyroxine (previously on 75mcg daily) The treatment provided moderate relief.       The following portions of the patient's history were reviewed and updated as appropriate: allergies, current medications, past family history, past medical history, past social history, past surgical history and problem list.     Allergies   Allergen Reactions   • Augmentin [Amoxicillin-Pot Clavulanate]      Current Outpatient Prescriptions:   •  levothyroxine (SYNTHROID) 75 MCG tablet, Take 1 tablet by mouth Daily., Disp: 30 tablet, Rfl: 1     Review of Systems   Constitutional: Positive for fatigue. Negative for activity change, appetite change, chills, diaphoresis and fever.   HENT: Negative.  Negative for sore throat.    Respiratory: Negative.    Cardiovascular: Negative for chest pain.   Gastrointestinal: Negative for abdominal pain, anorexia, diarrhea, nausea and vomiting.   Genitourinary: Negative.    Musculoskeletal: Negative for arthralgias, myalgias and neck pain.   Skin: Negative for rash.   Neurological: Negative.  Negative for dizziness and headaches.       Objective   Physical Exam   Constitutional: She is oriented to person, place, and time.  Vital signs are normal. She appears well-developed and well-nourished. She is cooperative. No distress.   HENT:   Mouth/Throat: Uvula is midline and mucous membranes are normal.   Neck: Normal range of motion. Neck supple. No thyromegaly present.   Cardiovascular: Normal rate, regular rhythm and normal heart sounds.    Pulmonary/Chest: Effort normal and breath sounds normal.   Lymphadenopathy:     She has no cervical adenopathy.   Neurological: She is alert and oriented to person, place, and time.   Skin: Skin is warm, dry and intact. No rash noted. She is not diaphoretic.   Psychiatric: She has a normal mood and affect. Her behavior is normal. Judgment and thought content normal.   Vitals reviewed.    Vitals:    08/02/18 0740   BP: 98/66   Pulse: 90   Resp: 18   Temp: 98.2 °F (36.8 °C)   SpO2: 97%   Body mass index is 27.1 kg/m².     Assessment/Plan   Diagnoses and all orders for this visit:    Hypothyroidism due to Hashimoto's thyroiditis  -     TSH       Discussed the nature of the medical condition(s) risks, complications, implications, management, safe and proper use of medications. Encouraged medication compliance, and keeping scheduled follow up appointments with me and any other providers.   Laboratory testing ordered today. Further recommendations after lab evaluation.

## 2018-08-02 NOTE — PATIENT INSTRUCTIONS
Hypothyroidism  Hypothyroidism is a disorder of the thyroid. The thyroid is a large gland that is located in the lower front of the neck. The thyroid releases hormones that control how the body works. With hypothyroidism, the thyroid does not make enough of these hormones.  What are the causes?  Causes of hypothyroidism may include:  · Viral infections.  · Pregnancy.  · Your own defense system (immune system) attacking your thyroid.  · Certain medicines.  · Birth defects.  · Past radiation treatments to your head or neck.  · Past treatment with radioactive iodine.  · Past surgical removal of part or all of your thyroid.  · Problems with the gland that is located in the center of your brain (pituitary).    What are the signs or symptoms?  Signs and symptoms of hypothyroidism may include:  · Feeling as though you have no energy (lethargy).  · Inability to tolerate cold.  · Weight gain that is not explained by a change in diet or exercise habits.  · Dry skin.  · Coarse hair.  · Menstrual irregularity.  · Slowing of thought processes.  · Constipation.  · Sadness or depression.    How is this diagnosed?  Your health care provider may diagnose hypothyroidism with blood tests and ultrasound tests.  How is this treated?  Hypothyroidism is treated with medicine that replaces the hormones that your body does not make. After you begin treatment, it may take several weeks for symptoms to go away.  Follow these instructions at home:  · Take medicines only as directed by your health care provider.  · If you start taking any new medicines, tell your health care provider.  · Keep all follow-up visits as directed by your health care provider. This is important. As your condition improves, your dosage needs may change. You will need to have blood tests regularly so that your health care provider can watch your condition.  Contact a health care provider if:  · Your symptoms do not get better with treatment.  · You are taking thyroid  replacement medicine and:  ? You sweat excessively.  ? You have tremors.  ? You feel anxious.  ? You lose weight rapidly.  ? You cannot tolerate heat.  ? You have emotional swings.  ? You have diarrhea.  ? You feel weak.  Get help right away if:  · You develop chest pain.  · You develop an irregular heartbeat.  · You develop a rapid heartbeat.  This information is not intended to replace advice given to you by your health care provider. Make sure you discuss any questions you have with your health care provider.  Document Released: 12/18/2006 Document Revised: 05/25/2017 Document Reviewed: 05/05/2015  [employer] Interactive Patient Education © 2018 [employer]  Thyroid-Stimulating Hormone Test  Why am I having this test?  A thyroid-stimulating hormone (TSH) test is a blood test that is done to measure the level of TSH, also known as thyrotropin, in your blood. TSH is produced by the pituitary gland. The pituitary gland is a small organ located just below the brain, behind your eyes and nasal passages. It is part of a system that monitors and maintains thyroid hormone levels and thyroid gland function. Thyroid hormones affect many body parts and systems, including the system that affects how quickly your body burns fuel for energy.  Your health care provider may recommend testing your TSH level if you have signs and symptoms of abnormal thyroid hormone levels. Knowing the level of TSH in your blood can help your health care provider:  · Diagnose a thyroid gland or pituitary gland disorder.  · Manage your condition and treatment if you have hypothyroidism or hyperthyroidism.    What kind of sample is taken?  A blood sample is required for this test. It is usually collected by inserting a needle into a vein.  How do I prepare for this test?  There is no preparation required for this test.  What are the reference ranges?  Reference ranges are considered healthy ranges established after testing a large group of healthy  people. Reference ranges may vary among different people, labs, and hospitals. It is your responsibility to obtain your test results. Ask the lab or department performing the test when and how you will get your results.  Range of Normal Values:  · Adult: 0.3-5 microunits/mL or 0.3-5 milliunits/L (SI units).  · : 3-18 microunits/mL or 3-18 milliunits/L.  · Cord: 3-12 microunits/mL or 3-12 milliunits/L.    What do the results mean?  A high level of TSH may mean:  · Your thyroid gland is not making enough thyroid hormones. When the thyroid gland does not make enough thyroid hormones, the pituitary gland releases TSH into the bloodstream. The higher-than-normal levels of TSH prompt the thyroid gland to release more thyroid hormones.  · You are getting an insufficient level of thyroid hormone medicine, if you are receiving this type of treatment.  · There is a problem with the pituitary gland (rare).    A low level of TSH can indicate a problem with the pituitary gland.  Talk with your health care provider to discuss your results, treatment options, and if necessary, the need for more tests. Talk with your health care provider if you have any questions about your results.  Talk with your health care provider to discuss your results, treatment options, and if necessary, the need for more tests. Talk with your health care provider if you have any questions about your results.  This information is not intended to replace advice given to you by your health care provider. Make sure you discuss any questions you have with your health care provider.  Document Released: 2006 Document Revised: 2017 Document Reviewed: 2015  [employer] Interactive Patient Education © 2018 [employer]  T3, Triiodothyronine Test  Why am I having this test?  The T3 test is used to evaluate thyroid function. This test helps monitor patients who are being treated for a thyroid disorder. It is also used to diagnose different  thyroid conditions including hyperthyroidism and thyroiditis.  What kind of sample is taken?  A blood sample is required for this test. It is usually collected by inserting a needle into a vein.  How do I prepare for this test?  There is no preparation required for this test.  What are the reference ranges?  Reference ranges are considered healthy ranges established after testing a large group of healthy people. Reference ranges may vary among different people, labs, and hospitals. It is your responsibility to obtain your test results. Ask the lab or department performing the test when and how you will get your results.  Reference ranges are based on age as follows:  · 1-3 days: 100-740 ng/dL.  · 1-11 months: 105-245 ng/dL.  · 1-5 years: 105-270 ng/dL.  · 6-10 years:  ng/dL.  · 11-15 years:  ng/dL.  · 16-20 years:  ng/dL.  · 20-50 years:  ng/dL.  · Older than 50 years:  ng/dL.    What do the results mean?  Increased levels of T3 may be seen with:  · Hyperthyroidism.  · Acute thyroiditis.  · Taking too much thyroid supplementation.    Decreased levels of T3 may be seen with:  · Hypothyroidism.  · Pituitary gland abnormality.  · Hypothalamus abnormality.  · Malnutrition.  · Liver disease.  · Kidney disease.    Talk with your health care provider to discuss your results, treatment options, and if necessary, the need for more tests. Talk with your health care provider if you have any questions about your results.  Talk with your health care provider to discuss your results, treatment options, and if necessary, the need for more tests. Talk with your health care provider if you have any questions about your results.  This information is not intended to replace advice given to you by your health care provider. Make sure you discuss any questions you have with your health care provider.  Document Released: 01/20/2006 Document Revised: 08/17/2017 Document Reviewed: 05/13/2015  [employer]  Interactive Patient Education © 2018 [employer]  Thyroxine Test  Your thyroid is a butterfly-shaped gland located in the lower part of the front of your neck. Your thyroid makes chemical messengers (hormones) that help control your body’s energy use. Thyroxine (T4) is the main hormone produced by your thyroid. Some T4 is bound to proteins in your blood. Some remains free (free T4). Your health care provider may test you for T4, free T4, or both.  Your health care provider may order a thyroxine test if you have symptoms of an overactive (hyperthyroid) or underactive (hypothyroid) thyroid gland. Symptoms of hyperthyroidism include:  · Tremors.  · Weight loss.  · Anxiety.  · Heat intolerance.    Symptoms of hypothyroidism include:  · Fatigue.  · Weight gain.  · Dry skin.  · Cold intolerance.    If you have thyroid disease and you are being treated with thyroid hormone, you may have this test to check your thyroid hormone level. If you have thyroid disease and you are pregnant, you may have this test to make sure your hormone levels remain normal during pregnancy. Newborns often have a free T4 test to screen for hypothyroidism.  This test requires a blood sample taken from a vein in your hand or arm.  How do I prepare for this test?  Let your health care provider know about all medicines you are taking, including vitamins, herbs, eye drops, creams, and over-the-counter medicines.  · Many drugs can affect your thyroid hormones, including birth control pills and aspirin.  · You may need to stop taking some drugs before the test.    Let your health care provider know about any medical conditions you have, such as:  · Liver disease.  · Any recent illness or stress.  · If you are or may be pregnant.    What do the results mean?  It is your responsibility to obtain your test results. Ask the lab or department performing the test when and how you will get your results. Contact your health care provider to discuss any  questions you have about your results.  Range of Normal Values  Ranges for normal values may vary among different labs and hospitals. You should always check with your health care provider after having lab work or other tests done to discuss whether your values are considered within normal limits.  Free T4 is measured in nanograms per deciliter (ng/dL). The normal range for free T4 depends on age:  · 0-4 days: 2-6 ng/dL or 26-77 pmol/L (SI units).  · 2 weeks to 20 years: 0.8-2 ng/dL or 10-26 pmol/L (SI units).  · Adult: 0.8-2.8 ng/dL or 10-36 pmol/L (SI units).    T4 is measured in micrograms per deciliter (mcg/dL). The normal range for T4 depends on age and gender:  · 1-3 days: 11-22 mcg/dL.  · 1-2 weeks: 10-16 mcg/dL.  · 1-12 months: 8-16 mcg/dL.  · 1-5 years: 7-15 mcg/dL.  · 5-10 years: 6-13 mcg/dL.  · 10-15 years: 5-12 mcg/dL.  · Adult male: 4-12 mcg/dL or  nmol/L (SI units).  · Adult female: 5-12 mcg/dL or  nmol/L (SI units).  · Any adult older than 60 years: 5-11 mcg/dL or  nmol/L (SI units).    Meaning of Results Outside Normal Value Ranges  Thyroxine levels alone may not provide enough information to diagnose a specific condition. In general:  · A high level of thyroxine can indicate hyperthyroidism.  · A low level of thyroxine can indicate hypothyroidism.    Discuss the meaning of results outside the normal range with your health care provider. You may need to have additional tests to help your health care provider make a diagnosis.  Talk with your health care provider to discuss your results, treatment options, and if necessary, the need for more tests. Talk with your health care provider if you have any questions about your results.  This information is not intended to replace advice given to you by your health care provider. Make sure you discuss any questions you have with your health care provider.  Document Released: 01/20/2006 Document Revised: 08/20/2017 Document Reviewed:  04/15/2015  [employer] Interactive Patient Education © 2018 [employer]  Levothyroxine tablets  What is this medicine?  LEVOTHYROXINE (moisés sullivan ROBER een) is a thyroid hormone. This medicine can improve symptoms of thyroid deficiency such as slow speech, lack of energy, weight gain, hair loss, dry skin, and feeling cold. It also helps to treat goiter (an enlarged thyroid gland). It is also used to treat some kinds of thyroid cancer along with surgery and other medicines.  This medicine may be used for other purposes; ask your health care provider or pharmacist if you have questions.  COMMON BRAND NAME(S): Estre, Levo-T, Levothroid, Levoxyl, Synthroid, Thyro-Tabs, Unithroid  What should I tell my health care provider before I take this medicine?  They need to know if you have any of these conditions:  -angina  -blood clotting problems  -diabetes  -dieting or on a weight loss program  -fertility problems  -heart disease  -high levels of thyroid hormone  -pituitary gland problem  -previous heart attack  -an unusual or allergic reaction to levothyroxine, thyroid hormones, other medicines, foods, dyes, or preservatives  -pregnant or trying to get pregnant  -breast-feeding  How should I use this medicine?  Take this medicine by mouth with plenty of water. It is best to take on an empty stomach, at least 30 minutes before or 2 hours after food. Follow the directions on the prescription label. Take at the same time each day. Do not take your medicine more often than directed.  Contact your pediatrician regarding the use of this medicine in children. While this drug may be prescribed for children and infants as young as a few days of age for selected conditions, precautions do apply. For infants, you may crush the tablet and place in a small amount of (5-10 ml or 1 to 2 teaspoonfuls) of water, breast milk, or non-soy based infant formula. Do not mix with soy-based infant formula. Give as directed.  Overdosage: If you think  you have taken too much of this medicine contact a poison control center or emergency room at once.  NOTE: This medicine is only for you. Do not share this medicine with others.  What if I miss a dose?  If you miss a dose, take it as soon as you can. If it is almost time for your next dose, take only that dose. Do not take double or extra doses.  What may interact with this medicine?  -amiodarone  -antacids  -anti-thyroid medicines  -calcium supplements  -carbamazepine  -cholestyramine  -colestipol  -digoxin  -female hormones, including contraceptive or birth control pills  -iron supplements  -ketamine  -liquid nutrition products like Ensure  -medicines for colds and breathing difficulties  -medicines for diabetes  -medicines for mental depression  -medicines or herbals used to decrease weight or appetite  -phenobarbital or other barbiturate medications  -phenytoin  -prednisone or other corticosteroids  -rifabutin  -rifampin  -soy isoflavones  -sucralfate  -theophylline  -warfarin  This list may not describe all possible interactions. Give your health care provider a list of all the medicines, herbs, non-prescription drugs, or dietary supplements you use. Also tell them if you smoke, drink alcohol, or use illegal drugs. Some items may interact with your medicine.  What should I watch for while using this medicine?  Be sure to take this medicine with plenty of fluids. Some tablets may cause choking, gagging, or difficulty swallowing from the tablet getting stuck in your throat. Most of these problems disappear if the medicine is taken with the right amount of water or other fluids.  Do not switch brands of this medicine unless your health care professional agrees with the change. Ask questions if you are uncertain.  You will need regular exams and occasional blood tests to check the response to treatment. If you are receiving this medicine for an underactive thyroid, it may be several weeks before you notice an  improvement. Check with your doctor or health care professional if your symptoms do not improve.  It may be necessary for you to take this medicine for the rest of your life. Do not stop using this medicine unless your doctor or health care professional advises you to.  This medicine can affect blood sugar levels. If you have diabetes, check your blood sugar as directed.  You may lose some of your hair when you first start treatment. With time, this usually corrects itself.  If you are going to have surgery, tell your doctor or health care professional that you are taking this medicine.  What side effects may I notice from receiving this medicine?  Side effects that you should report to your doctor or health care professional as soon as possible:  -allergic reactions like skin rash, itching or hives, swelling of the face, lips, or tongue  -chest pain  -excessive sweating or intolerance to heat  -fast or irregular heartbeat  -nervousness  -skin rash or hives  -swelling of ankles, feet, or legs  -tremors  Side effects that usually do not require medical attention (report to your doctor or health care professional if they continue or are bothersome):  -changes in appetite  -changes in menstrual periods  -diarrhea  -hair loss  -headache  -trouble sleeping  -weight loss  This list may not describe all possible side effects. Call your doctor for medical advice about side effects. You may report side effects to FDA at 9-769-FDA-7351.  Where should I keep my medicine?  Keep out of the reach of children.  Store at room temperature between 15 and 30 degrees C (59 and 86 degrees F). Protect from light and moisture. Keep container tightly closed. Throw away any unused medicine after the expiration date.  NOTE: This sheet is a summary. It may not cover all possible information. If you have questions about this medicine, talk to your doctor, pharmacist, or health care provider.  © 2018 [employer] (2010-03-26  14:28:07)

## 2018-09-19 DIAGNOSIS — Z3A.01 LESS THAN 8 WEEKS GESTATION OF PREGNANCY: ICD-10-CM

## 2018-09-19 DIAGNOSIS — E03.8 HYPOTHYROIDISM DUE TO HASHIMOTO'S THYROIDITIS: Primary | ICD-10-CM

## 2018-09-19 DIAGNOSIS — E06.3 HYPOTHYROIDISM DUE TO HASHIMOTO'S THYROIDITIS: Primary | ICD-10-CM

## 2018-10-02 DIAGNOSIS — E03.8 HYPOTHYROIDISM DUE TO HASHIMOTO'S THYROIDITIS: ICD-10-CM

## 2018-10-02 DIAGNOSIS — E06.3 HYPOTHYROIDISM DUE TO HASHIMOTO'S THYROIDITIS: ICD-10-CM

## 2018-10-03 RX ORDER — LEVOTHYROXINE SODIUM 0.07 MG/1
75 TABLET ORAL DAILY
Qty: 30 TABLET | Refills: 1 | Status: SHIPPED | OUTPATIENT
Start: 2018-10-03 | End: 2018-10-09 | Stop reason: DRUGHIGH

## 2018-10-08 ENCOUNTER — OFFICE VISIT (OUTPATIENT)
Dept: ENDOCRINOLOGY | Facility: CLINIC | Age: 28
End: 2018-10-08

## 2018-10-08 VITALS
WEIGHT: 159 LBS | BODY MASS INDEX: 27.14 KG/M2 | HEART RATE: 101 BPM | OXYGEN SATURATION: 98 % | HEIGHT: 64 IN | SYSTOLIC BLOOD PRESSURE: 110 MMHG | DIASTOLIC BLOOD PRESSURE: 80 MMHG

## 2018-10-08 DIAGNOSIS — O99.281 HYPOTHYROIDISM DURING PREGNANCY IN FIRST TRIMESTER: Primary | ICD-10-CM

## 2018-10-08 DIAGNOSIS — E03.9 HYPOTHYROIDISM DURING PREGNANCY IN FIRST TRIMESTER: Primary | ICD-10-CM

## 2018-10-08 LAB
T4 FREE SERPL-MCNC: 1.03 NG/DL (ref 0.89–1.76)
TSH SERPL DL<=0.05 MIU/L-ACNC: 7.33 MIU/ML (ref 0.35–5.35)

## 2018-10-08 PROCEDURE — 99243 OFF/OP CNSLTJ NEW/EST LOW 30: CPT | Performed by: INTERNAL MEDICINE

## 2018-10-08 PROCEDURE — 84439 ASSAY OF FREE THYROXINE: CPT | Performed by: INTERNAL MEDICINE

## 2018-10-08 PROCEDURE — 84443 ASSAY THYROID STIM HORMONE: CPT | Performed by: INTERNAL MEDICINE

## 2018-10-08 NOTE — PROGRESS NOTES
Chief Complaint   Patient presents with   • Thyroid Problem     Consult for Aleta HENRIQUEZ for hypothyroidism thyroiditis in pregnancy with ORLY 2019       HPI:   Nicole Jose is a 28 y.o.female sent in consultation by FLORENCE Noriega for further evaluation of pt's hypothyroidism during pregnancy. Her history is as follows:    1) hypothyroidism due to Hashimoto's thyroiditis:   Diagnosed at age 20  - reports a h/o of not consistently taking her medication  - (2018) TSH was 7.958 - pt reports she had been out of her Rx for 2 weeks prior to lab  - no known family h/o thyroid disease    2) hypothyroidism during pregnancy:  - estimates she is 7 weeks gestation. ORLY 2019. Has not seen her OB yet  G4,P2,A1,L2    Current Dose: levothyroxine 75 mcg  - Takes in AM with water. Waits only about 10 minutes before food or hot liquids  - takes prenatal vitamin at night  - not on high dose biotin    Other medical history:   - gestational diabetes with last pregnancy, diet controlled  (2018) A1C% 5.40      Review of Systems   Constitutional: Positive for fatigue.   HENT: Negative.    Eyes: Negative.    Respiratory: Negative.    Cardiovascular: Positive for palpitations (occasional ).   Gastrointestinal: Negative.  Negative for nausea.   Endocrine: Negative.    Genitourinary: Negative.    Musculoskeletal: Negative.    Skin: Negative.    Allergic/Immunologic: Negative.    Neurological: Negative.    Hematological: Negative.    Psychiatric/Behavioral: Positive for sleep disturbance.     Past Medical History:   Diagnosis Date   • H/O gestational diabetes in prior pregnancy, currently pregnant    • History of blood transfusion     with 1 st    • Hypothyroidism      family history includes Cervical cancer in her mother; Hypertension in her father.  Past Surgical History:   Procedure Laterality Date   •  SECTION  2012   •  SECTION  2016     Social History   Substance Use  "Topics   • Smoking status: Former Smoker     Types: Cigarettes   • Smokeless tobacco: Never Used      Comment: pt quit about 2-3 mo ago 08/2/2018   • Alcohol use No      Comment: RARE       Current Outpatient Prescriptions:   •  levothyroxine (SYNTHROID, LEVOTHROID) 75 MCG tablet, Take 1 tablet by mouth Every Morning., Disp: 30 tablet, Rfl: 5  Allergies   Allergen Reactions   • Augmentin [Amoxicillin-Pot Clavulanate]        /80   Pulse 101   Ht 161.3 cm (63.5\")   Wt 72.1 kg (159 lb)   LMP 01/10/2018 (Approximate)   SpO2 98%   BMI 27.72 kg/m²   Physical Exam   Constitutional: She is oriented to person, place, and time. She appears well-developed. No distress.   HENT:   Head: Normocephalic.   Mouth/Throat: Oropharynx is clear and moist.   Eyes: Pupils are equal, round, and reactive to light. Conjunctivae and EOM are normal.   Neck: No tracheal deviation present. No thyromegaly present.   No palpable thyroid nodules     Cardiovascular: Normal rate, regular rhythm and normal heart sounds.    No murmur heard.  Rate normal on my exam   Pulmonary/Chest: Effort normal and breath sounds normal. No respiratory distress.   Lymphadenopathy:     She has no cervical adenopathy.   Neurological: She is alert and oriented to person, place, and time. No cranial nerve deficit.   Skin: Skin is warm and dry. She is not diaphoretic. No erythema.   Psychiatric: She has a normal mood and affect. Her behavior is normal.   Vitals reviewed.    LABS/IMAGING: outside records reviewed and summarized in HPI  Results for orders placed or performed in visit on 10/08/18   TSH   Result Value Ref Range    TSH 7.334 (H) 0.350 - 5.350 mIU/mL   T4, Free   Result Value Ref Range    Free T4 1.03 0.89 - 1.76 ng/dL     ASSESSMENT/PLAN:  1) hypothyroidism during pregnancy, first trimester:  - labs checked today show her TSH to be elevated. Reviewed trimester specific goals for TSH with patient.  - Increasing levothyroxine to 100 mcg QAM.  - " Reviewed proper thyroid hormone administration, and factors to avoid that decrease medication potency and medication absorption.   - pt to have TSH checked in 5 weeks. Will adjust dose as indicated.  - reviewed importance of medication compliance during pregnancy    Pt to RTC in 12 weeks

## 2018-10-09 ENCOUNTER — TELEPHONE (OUTPATIENT)
Dept: ENDOCRINOLOGY | Facility: CLINIC | Age: 28
End: 2018-10-09

## 2018-10-09 PROBLEM — E03.9 HYPOTHYROIDISM DURING PREGNANCY IN FIRST TRIMESTER: Status: ACTIVE | Noted: 2018-10-09

## 2018-10-09 PROBLEM — O99.281 HYPOTHYROIDISM DURING PREGNANCY IN FIRST TRIMESTER: Status: ACTIVE | Noted: 2018-10-09

## 2018-10-09 RX ORDER — LEVOTHYROXINE SODIUM 0.1 MG/1
100 TABLET ORAL EVERY MORNING
Qty: 30 TABLET | Refills: 5 | Status: SHIPPED | OUTPATIENT
Start: 2018-10-09 | End: 2019-05-07 | Stop reason: SDUPTHER

## 2018-10-09 NOTE — TELEPHONE ENCOUNTER
Spoke to patient about lab results. See clinic note from 10/08/2018 for details.   Liliane Henry MD

## 2019-05-07 DIAGNOSIS — O99.281 HYPOTHYROIDISM DURING PREGNANCY IN FIRST TRIMESTER: ICD-10-CM

## 2019-05-07 DIAGNOSIS — E03.9 HYPOTHYROIDISM DURING PREGNANCY IN FIRST TRIMESTER: ICD-10-CM

## 2019-05-07 NOTE — TELEPHONE ENCOUNTER
See if she wants to follow-up with me or Aron. If she does, give her enough refills until her appointment   Thanks  MD

## 2019-05-07 NOTE — TELEPHONE ENCOUNTER
Please advise of medication refills . Pt no showed last appointment 1/2/19  and do not seem to have one scheduled .

## 2019-05-10 RX ORDER — LEVOTHYROXINE SODIUM 0.1 MG/1
TABLET ORAL
Qty: 30 TABLET | Refills: 5 | Status: SHIPPED | OUTPATIENT
Start: 2019-05-10 | End: 2019-10-10 | Stop reason: DRUGHIGH

## 2019-05-15 ENCOUNTER — OFFICE VISIT (OUTPATIENT)
Dept: ENDOCRINOLOGY | Facility: CLINIC | Age: 29
End: 2019-05-15

## 2019-05-15 VITALS
BODY MASS INDEX: 33.66 KG/M2 | OXYGEN SATURATION: 99 % | WEIGHT: 190 LBS | SYSTOLIC BLOOD PRESSURE: 128 MMHG | DIASTOLIC BLOOD PRESSURE: 80 MMHG | HEART RATE: 98 BPM

## 2019-05-15 DIAGNOSIS — O99.283 HYPOTHYROIDISM DURING PREGNANCY IN THIRD TRIMESTER: Primary | ICD-10-CM

## 2019-05-15 DIAGNOSIS — E06.3 HYPOTHYROIDISM DUE TO HASHIMOTO'S THYROIDITIS: ICD-10-CM

## 2019-05-15 DIAGNOSIS — E03.8 HYPOTHYROIDISM DUE TO HASHIMOTO'S THYROIDITIS: ICD-10-CM

## 2019-05-15 DIAGNOSIS — E03.9 HYPOTHYROIDISM DURING PREGNANCY IN THIRD TRIMESTER: Primary | ICD-10-CM

## 2019-05-15 DIAGNOSIS — O24.415 GESTATIONAL DIABETES MELLITUS (GDM) IN THIRD TRIMESTER CONTROLLED ON ORAL HYPOGLYCEMIC DRUG: ICD-10-CM

## 2019-05-15 LAB
GLUCOSE BLDC GLUCOMTR-MCNC: 119 MG/DL (ref 70–130)
HBA1C MFR BLD: 5.6 %

## 2019-05-15 PROCEDURE — 84439 ASSAY OF FREE THYROXINE: CPT | Performed by: PHYSICIAN ASSISTANT

## 2019-05-15 PROCEDURE — 82947 ASSAY GLUCOSE BLOOD QUANT: CPT | Performed by: PHYSICIAN ASSISTANT

## 2019-05-15 PROCEDURE — 84443 ASSAY THYROID STIM HORMONE: CPT | Performed by: PHYSICIAN ASSISTANT

## 2019-05-15 PROCEDURE — 83036 HEMOGLOBIN GLYCOSYLATED A1C: CPT | Performed by: PHYSICIAN ASSISTANT

## 2019-05-15 PROCEDURE — 99214 OFFICE O/P EST MOD 30 MIN: CPT | Performed by: PHYSICIAN ASSISTANT

## 2019-05-15 NOTE — PROGRESS NOTES
Chief Complaint   Patient presents with   • Follow-up     Hypothyroidism       HPI:   Nicole Jose is a 28 y.o.female sent in for f/u of hypothyroidism during pregnancy. Last visit 10/8/18. Her history is as follows:    Interim Events:  -  Patient cancelled one appt and was no show another appt since last visit  - Did not have TSH checked following last visit  - Was diagnosed with gestational diabetes since last visit and was started on metformin 500mg TID by OB who has been following her GDM. She is 38 1/2 week and will have a  next week. Reports her fasting BS has been ~100 with postprandial readings 110-120. A1C 5.6 today    1) hypothyroidism due to Hashimoto's thyroiditis:   Diagnosed at age 20  - reports a h/o of not consistently taking her medication  - (2018) TSH was 7.958 - pt reports she had been out of her Rx for 2 weeks prior to lab  - no known family h/o thyroid disease    2) hypothyroidism during pregnancy:  - estimates she is 38.5 weeks gestation.  scheduled 19.   G4,P2,A1,L2    Current Dose: levothyroxine 100 mcg, dose increased from 75mcg 10/2018  - Takes in AM with water. Sometimes waits 15-20 minutes and other times hours to eat. If she misses a dose she takes 2 the following day.   - takes prenatal vitamin at night  - not on high dose biotin    Other medical history:   - gestational diabetes with last pregnancy, diet controlled  (2018) A1C% 5.40      Review of Systems   Constitutional: Positive for fatigue.   HENT: Negative.    Eyes: Negative.    Respiratory: Negative.    Cardiovascular: Positive for palpitations (occasional ).   Gastrointestinal: Negative.  Negative for nausea.   Endocrine: Negative.    Genitourinary: Negative.    Musculoskeletal: Negative.    Skin: Negative.    Allergic/Immunologic: Negative.    Neurological: Negative.    Hematological: Negative.    Psychiatric/Behavioral: Positive for sleep disturbance.     Past Medical History:   Diagnosis Date    • H/O gestational diabetes in prior pregnancy, currently pregnant    • History of blood transfusion     with 1 st    • Hypothyroidism      family history includes Cervical cancer in her mother; Hypertension in her father.  Past Surgical History:   Procedure Laterality Date   •  SECTION  2012   •  SECTION  2016     Social History     Tobacco Use   • Smoking status: Former Smoker     Types: Cigarettes     Last attempt to quit: 2018     Years since quittin.7   • Smokeless tobacco: Never Used   Substance Use Topics   • Alcohol use: No   • Drug use: No       Current Outpatient Prescriptions:   •  levothyroxine (SYNTHROID, LEVOTHROID) 75 MCG tablet, Take 1 tablet by mouth Every Morning., Disp: 30 tablet, Rfl: 5  Allergies   Allergen Reactions   • Augmentin [Amoxicillin-Pot Clavulanate] Unknown (See Comments)     As a child       /80   Pulse 98   Wt 86.2 kg (190 lb)   SpO2 99%   BMI 33.66 kg/m²   Physical Exam   Constitutional: She is oriented to person, place, and time. She appears well-developed. No distress.   HENT:   Head: Normocephalic.   Mouth/Throat: Oropharynx is clear and moist.   Eyes: Conjunctivae and EOM are normal. Pupils are equal, round, and reactive to light.   Neck: No tracheal deviation present. No thyromegaly present.   No palpable thyroid nodules     Cardiovascular: Normal rate, regular rhythm and normal heart sounds.   No murmur heard.  Rate normal on my exam   Pulmonary/Chest: Effort normal and breath sounds normal. No respiratory distress.   Lymphadenopathy:     She has no cervical adenopathy.   Neurological: She is alert and oriented to person, place, and time. No cranial nerve deficit.   Skin: Skin is warm and dry. She is not diaphoretic. No erythema.   Psychiatric: She has a normal mood and affect. Her behavior is normal.   Vitals reviewed.    LABS/IMAGING: outside records reviewed and summarized in HPI  Results for orders placed or performed  in visit on 05/15/19   POC Glycosylated Hemoglobin (Hb A1C)   Result Value Ref Range    Hemoglobin A1C 5.6 %   POC Glucose Fingerstick   Result Value Ref Range    Glucose 119 70 - 130 mg/dL     ASSESSMENT/PLAN:  1) hypothyroidism during pregnancy, third trimester:  - Continue levothyroxine to 100 mcg QAM. Recheck labs today  - Reviewed proper thyroid hormone administration, and factors to avoid that decrease medication potency and medication absorption.   - reviewed importance of medication compliance during pregnancy    2) Gestational diabetes, third trimeter  - OB has been following, she is on metformin 500mg TID  - A1C 5.6 today  -  scheduled next week    Pt to RTC in 4 months with Dr. Henry

## 2019-05-16 LAB
T4 FREE SERPL-MCNC: 0.99 NG/DL (ref 0.93–1.7)
TSH SERPL DL<=0.05 MIU/L-ACNC: 1.9 MIU/ML (ref 0.27–4.2)

## 2019-10-09 ENCOUNTER — OFFICE VISIT (OUTPATIENT)
Dept: ENDOCRINOLOGY | Facility: CLINIC | Age: 29
End: 2019-10-09

## 2019-10-09 VITALS
OXYGEN SATURATION: 99 % | SYSTOLIC BLOOD PRESSURE: 120 MMHG | WEIGHT: 173.6 LBS | BODY MASS INDEX: 30.75 KG/M2 | HEART RATE: 92 BPM | DIASTOLIC BLOOD PRESSURE: 74 MMHG

## 2019-10-09 DIAGNOSIS — Z86.32 H/O GESTATIONAL DIABETES MELLITUS, NOT CURRENTLY PREGNANT: ICD-10-CM

## 2019-10-09 DIAGNOSIS — E06.3 HYPOTHYROIDISM DUE TO HASHIMOTO'S THYROIDITIS: Primary | ICD-10-CM

## 2019-10-09 DIAGNOSIS — E03.8 HYPOTHYROIDISM DUE TO HASHIMOTO'S THYROIDITIS: Primary | ICD-10-CM

## 2019-10-09 LAB
GLUCOSE BLDC GLUCOMTR-MCNC: 83 MG/DL (ref 70–130)
HBA1C MFR BLD: 5.3 %

## 2019-10-09 PROCEDURE — 82947 ASSAY GLUCOSE BLOOD QUANT: CPT | Performed by: INTERNAL MEDICINE

## 2019-10-09 PROCEDURE — 99213 OFFICE O/P EST LOW 20 MIN: CPT | Performed by: INTERNAL MEDICINE

## 2019-10-09 PROCEDURE — 83036 HEMOGLOBIN GLYCOSYLATED A1C: CPT | Performed by: INTERNAL MEDICINE

## 2019-10-09 PROCEDURE — 84443 ASSAY THYROID STIM HORMONE: CPT | Performed by: INTERNAL MEDICINE

## 2019-10-09 NOTE — PROGRESS NOTES
Chief Complaint   Patient presents with   • Hypothyroidism     f/u       HPI:   Nicole Jose is a 29 y.o.female who returns to [facility] for f/u evaluation of her hypothyroidism and h/o gesational diabetes during pregnancy. Last visit 05/15/2019 with my colleague Aron Ferrell PA-C. Her history is as follows:    Interim Events:   - delivered third child in 05/23/2019. No post-op complications  - denies missed doses of levothyroxine  - has been taking high dose biotin supplement, but none in last 3-5 days  - currently breastfeeding    1) hypothyroidism due to Hashimoto's thyroiditis:   Diagnosed at age 20  - reports a h/o of not consistently taking her medication but better compliance now    - no known family h/o thyroid disease    Current Dose: levothyroxine 100 mcg  - Takes in AM with water. Waits only about 20 - 25minutes before food or hot liquids  - no MVI at this time  - no missed doses  - has not had high dose biotin for pas 3-5 days    2) h/o gestational diabetes mellitus:  - gestational diabetes with second pregnancy, diet controlled  - was diagnosed with gestational DM at the end of third pregnancy in 04/2019. Was treated by her OB with metformin   - has not had a f/u A1C% since delivery     Review of Systems   Constitutional: Positive for fatigue.        Expected weight loss   HENT: Negative.    Eyes: Negative.    Respiratory: Negative.    Cardiovascular: Negative for palpitations.   Gastrointestinal: Negative.  Negative for nausea.   Endocrine: Negative.    Genitourinary: Negative.    Musculoskeletal: Negative.    Skin: Negative.    Allergic/Immunologic: Negative.    Neurological: Negative.    Hematological: Negative.    Psychiatric/Behavioral: Positive for sleep disturbance.       The following portions of the patient's history were reviewed and updated as appropriate: allergies, current medications, past family history, past medical history, past social history, past surgical history and  problem list.      /74   Pulse 92   Wt 78.7 kg (173 lb 9.6 oz)   SpO2 99%   Breastfeeding? No   BMI 30.75 kg/m²   Physical Exam   Constitutional: She is oriented to person, place, and time. She appears well-developed. No distress.   HENT:   Head: Normocephalic.   Mouth/Throat: Oropharynx is clear and moist.   Eyes: Conjunctivae and EOM are normal. Pupils are equal, round, and reactive to light.   Neck: No tracheal deviation present. No thyromegaly present.   No palpable thyroid nodules     Cardiovascular: Normal rate, regular rhythm and normal heart sounds.   No murmur heard.  Pulmonary/Chest: Effort normal and breath sounds normal. No respiratory distress.   Lymphadenopathy:     She has no cervical adenopathy.   Neurological: She is alert and oriented to person, place, and time. No cranial nerve deficit.   Skin: Skin is warm and dry. She is not diaphoretic. No erythema.   Psychiatric: She has a normal mood and affect. Her behavior is normal.   Vitals reviewed.    LABS/IMAGING: outside records reviewed and summarized in HPI  Results for orders placed or performed in visit on 10/09/19   TSH   Result Value Ref Range    TSH 6.140 (H) 0.270 - 4.200 uIU/mL   POC Glycosylated Hemoglobin (Hb A1C)   Result Value Ref Range    Hemoglobin A1C 5.3 %   POCT Glucose   Result Value Ref Range    Glucose 83 70 - 130 mg/dL     ASSESSMENT/PLAN:  1) hypothyroidism due to Hashimoto's thyroiditis:  - labs checked today show her TSH to be elevated.  - Increasing levothyroxine to 125 mcg QAM.  - Pt to have repeat TSH completed in 2 months. Will adjust dose as indicated  - Reviewed proper thyroid hormone administration, and factors to avoid that decrease medication potency and medication absorption.     2) h/o gestational diabetes  - postpartum Hgb A1C% was normal at 5.3% today    Pt to RTC in 6 months

## 2019-10-10 ENCOUNTER — TELEPHONE (OUTPATIENT)
Dept: ENDOCRINOLOGY | Facility: CLINIC | Age: 29
End: 2019-10-10

## 2019-10-10 LAB — TSH SERPL DL<=0.05 MIU/L-ACNC: 6.14 UIU/ML (ref 0.27–4.2)

## 2019-10-10 RX ORDER — LEVOTHYROXINE SODIUM 0.12 MG/1
125 TABLET ORAL EVERY MORNING
Qty: 90 TABLET | Refills: 1 | Status: SHIPPED | OUTPATIENT
Start: 2019-10-10 | End: 2020-04-02

## 2019-10-10 NOTE — TELEPHONE ENCOUNTER
Spoke to patient about lab results. See clinic note from 10/09/2019 for details.   Liliane Henry MD

## 2020-04-02 DIAGNOSIS — E03.8 HYPOTHYROIDISM DUE TO HASHIMOTO'S THYROIDITIS: ICD-10-CM

## 2020-04-02 DIAGNOSIS — E06.3 HYPOTHYROIDISM DUE TO HASHIMOTO'S THYROIDITIS: ICD-10-CM

## 2020-04-02 RX ORDER — LEVOTHYROXINE SODIUM 0.12 MG/1
125 TABLET ORAL EVERY MORNING
Qty: 90 TABLET | Refills: 1 | Status: SHIPPED | OUTPATIENT
Start: 2020-04-02 | End: 2020-09-28 | Stop reason: SDUPTHER

## 2020-09-25 DIAGNOSIS — E06.3 HYPOTHYROIDISM DUE TO HASHIMOTO'S THYROIDITIS: ICD-10-CM

## 2020-09-25 DIAGNOSIS — E03.8 HYPOTHYROIDISM DUE TO HASHIMOTO'S THYROIDITIS: ICD-10-CM

## 2020-09-25 RX ORDER — LEVOTHYROXINE SODIUM 0.12 MG/1
125 TABLET ORAL EVERY MORNING
Qty: 90 TABLET | Refills: 1 | OUTPATIENT
Start: 2020-09-25

## 2020-09-28 DIAGNOSIS — E06.3 HYPOTHYROIDISM DUE TO HASHIMOTO'S THYROIDITIS: ICD-10-CM

## 2020-09-28 DIAGNOSIS — E03.8 HYPOTHYROIDISM DUE TO HASHIMOTO'S THYROIDITIS: ICD-10-CM

## 2020-09-28 RX ORDER — LEVOTHYROXINE SODIUM 0.12 MG/1
125 TABLET ORAL EVERY MORNING
Qty: 90 TABLET | Refills: 1 | Status: SHIPPED | OUTPATIENT
Start: 2020-09-28 | End: 2021-06-19 | Stop reason: SDUPTHER

## 2021-06-03 DIAGNOSIS — E06.3 HYPOTHYROIDISM DUE TO HASHIMOTO'S THYROIDITIS: ICD-10-CM

## 2021-06-03 DIAGNOSIS — E03.8 HYPOTHYROIDISM DUE TO HASHIMOTO'S THYROIDITIS: ICD-10-CM

## 2021-06-03 RX ORDER — LEVOTHYROXINE SODIUM 0.12 MG/1
TABLET ORAL
Qty: 90 TABLET | Refills: 3 | OUTPATIENT
Start: 2021-06-03

## 2021-06-07 ENCOUNTER — LAB (OUTPATIENT)
Dept: LAB | Facility: HOSPITAL | Age: 31
End: 2021-06-07

## 2021-06-07 ENCOUNTER — OFFICE VISIT (OUTPATIENT)
Dept: ENDOCRINOLOGY | Facility: CLINIC | Age: 31
End: 2021-06-07

## 2021-06-07 VITALS
WEIGHT: 172 LBS | OXYGEN SATURATION: 98 % | HEART RATE: 78 BPM | SYSTOLIC BLOOD PRESSURE: 118 MMHG | BODY MASS INDEX: 29.37 KG/M2 | HEIGHT: 64 IN | DIASTOLIC BLOOD PRESSURE: 66 MMHG

## 2021-06-07 DIAGNOSIS — E06.3 HYPOTHYROIDISM DUE TO HASHIMOTO'S THYROIDITIS: Primary | ICD-10-CM

## 2021-06-07 DIAGNOSIS — E03.8 HYPOTHYROIDISM DUE TO HASHIMOTO'S THYROIDITIS: Primary | ICD-10-CM

## 2021-06-07 PROCEDURE — 84443 ASSAY THYROID STIM HORMONE: CPT | Performed by: INTERNAL MEDICINE

## 2021-06-07 PROCEDURE — 99213 OFFICE O/P EST LOW 20 MIN: CPT | Performed by: INTERNAL MEDICINE

## 2021-06-07 NOTE — PROGRESS NOTES
"Chief Complaint   Patient presents with   • Hypothyroidism     follow up       HPI:   Nicole Jose is a 30 y.o.female who returns to [facility]ocrine Clinic for f/u evaluation of her hypothyroidism. Last visit 10/09/2019. Her history is as follows:    Interim Events:   - is 2 years post-partum  - is currently breast feeding. Is in process of weaning  - Reports fatigue. Reports sleep disturbance  - May have missed doses    1) hypothyroidism due to Hashimoto's thyroiditis:   Diagnosed at age 20  - reports a h/o of not consistently taking her medication but better compliance now  - no known family h/o thyroid disease    Current Dose: levothyroxine 125 mcg  - Takes in AM with water. Waits about 20 - 25minutes before food or hot liquids  - no MVI at this time  - may have missed a few doses  - is not on high dose biotin    2) h/o gestational diabetes mellitus:  - gestational diabetes with second pregnancy, diet controlled  - was diagnosed with gestational DM at the end of third pregnancy in 04/2019. Was treated by her OB with metformin   - Last A1C% 5.3 in 10/2019    Review of Systems   Constitutional: Positive for fatigue.        Wt stable   HENT: Negative.    Eyes: Negative.    Respiratory: Negative.    Cardiovascular: Negative for palpitations.   Gastrointestinal: Negative.  Negative for nausea.   Endocrine: Negative.    Genitourinary: Negative.    Musculoskeletal: Negative.    Skin: Negative.    Allergic/Immunologic: Negative.    Neurological: Negative.    Hematological: Negative.    Psychiatric/Behavioral: Positive for sleep disturbance.       The following portions of the patient's history were reviewed and updated as appropriate: allergies, current medications, past family history, past medical history, past social history, past surgical history and problem list.      /66   Pulse 78   Ht 161.3 cm (63.5\")   Wt 78 kg (172 lb)   SpO2 98%   BMI 29.99 kg/m²   Physical Exam   Constitutional: She is oriented to " person, place, and time. She appears well-developed. No distress.   HENT:   Head: Normocephalic.   Eyes: Pupils are equal, round, and reactive to light. Conjunctivae are normal.   Neck: No tracheal deviation present. No thyromegaly present.   No palpable thyroid nodules     Cardiovascular: Normal rate, regular rhythm and normal heart sounds.   No murmur heard.  Pulmonary/Chest: Effort normal and breath sounds normal. No respiratory distress.   Lymphadenopathy:     She has no cervical adenopathy.   Neurological: She is alert and oriented to person, place, and time. No cranial nerve deficit.   Skin: Skin is warm and dry. She is not diaphoretic. No erythema.   Psychiatric: Her behavior is normal.   Vitals reviewed.    LABS/IMAGING: outside records reviewed and summarized in HPI  Results for orders placed or performed in visit on 06/07/21   TSH    Specimen: Blood   Result Value Ref Range    TSH 3.120 0.270 - 4.200 uIU/mL     ASSESSMENT/PLAN:  1) hypothyroidism due to Hashimoto's thyroiditis:  - TSH checked today WNL  - Continue levothyroxine to 125 mcg QAM.  - Reviewed proper thyroid hormone administration, and factors to avoid that decrease medication potency and medication absorption.   - Will check TSH at her f/u visit an adjust dose as indicated    Pt to RTC in 6 months    Counseling was given to patient for the following topics:  instructions for management, see details in assessment/plan. Total face to face time of the encounter was 15 minutes and 10 minutes was spent counseling.    Signed: Liliane Henry MD

## 2021-06-08 LAB — TSH SERPL DL<=0.05 MIU/L-ACNC: 3.12 UIU/ML (ref 0.27–4.2)

## 2021-06-16 DIAGNOSIS — E03.8 HYPOTHYROIDISM DUE TO HASHIMOTO'S THYROIDITIS: ICD-10-CM

## 2021-06-16 DIAGNOSIS — E06.3 HYPOTHYROIDISM DUE TO HASHIMOTO'S THYROIDITIS: ICD-10-CM

## 2021-06-19 DIAGNOSIS — E06.3 HYPOTHYROIDISM DUE TO HASHIMOTO'S THYROIDITIS: ICD-10-CM

## 2021-06-19 DIAGNOSIS — E03.8 HYPOTHYROIDISM DUE TO HASHIMOTO'S THYROIDITIS: ICD-10-CM

## 2021-06-20 RX ORDER — LEVOTHYROXINE SODIUM 0.12 MG/1
125 TABLET ORAL EVERY MORNING
Qty: 90 TABLET | Refills: 1 | Status: SHIPPED | OUTPATIENT
Start: 2021-06-20 | End: 2021-12-10

## 2021-06-20 RX ORDER — LEVOTHYROXINE SODIUM 0.12 MG/1
125 TABLET ORAL EVERY MORNING
Qty: 90 TABLET | Refills: 1 | Status: SHIPPED | OUTPATIENT
Start: 2021-06-20 | End: 2022-04-04 | Stop reason: SDUPTHER

## 2021-12-10 DIAGNOSIS — E06.3 HYPOTHYROIDISM DUE TO HASHIMOTO'S THYROIDITIS: ICD-10-CM

## 2021-12-10 DIAGNOSIS — E03.8 HYPOTHYROIDISM DUE TO HASHIMOTO'S THYROIDITIS: ICD-10-CM

## 2021-12-10 RX ORDER — LEVOTHYROXINE SODIUM 0.12 MG/1
TABLET ORAL
Qty: 90 TABLET | Refills: 3 | Status: SHIPPED | OUTPATIENT
Start: 2021-12-10 | End: 2022-04-04 | Stop reason: SDUPTHER

## 2022-04-04 ENCOUNTER — OFFICE VISIT (OUTPATIENT)
Dept: ENDOCRINOLOGY | Facility: CLINIC | Age: 32
End: 2022-04-04

## 2022-04-04 VITALS
DIASTOLIC BLOOD PRESSURE: 66 MMHG | OXYGEN SATURATION: 98 % | HEART RATE: 86 BPM | HEIGHT: 63 IN | WEIGHT: 176 LBS | BODY MASS INDEX: 31.18 KG/M2 | SYSTOLIC BLOOD PRESSURE: 124 MMHG

## 2022-04-04 DIAGNOSIS — Z00.00 HEALTHCARE MAINTENANCE: ICD-10-CM

## 2022-04-04 DIAGNOSIS — E06.3 HYPOTHYROIDISM DUE TO HASHIMOTO'S THYROIDITIS: Primary | ICD-10-CM

## 2022-04-04 DIAGNOSIS — E03.8 HYPOTHYROIDISM DUE TO HASHIMOTO'S THYROIDITIS: Primary | ICD-10-CM

## 2022-04-04 PROCEDURE — 99213 OFFICE O/P EST LOW 20 MIN: CPT | Performed by: INTERNAL MEDICINE

## 2022-04-04 RX ORDER — LEVOTHYROXINE SODIUM 0.12 MG/1
125 TABLET ORAL EVERY MORNING
Qty: 90 TABLET | Refills: 3 | Status: SHIPPED | OUTPATIENT
Start: 2022-04-04 | End: 2022-06-28 | Stop reason: DRUGHIGH

## 2022-04-04 NOTE — PROGRESS NOTES
"Chief Complaint   Patient presents with   • Hypothyroidism     Follow up        HPI:   Nicole Jose is a 31 y.o.female who returns to [facility] Clinic for f/u evaluation of her hypothyroidism. Last visit 06/07/2021. Her history is as follows:    Interim Events:   - Had [place] out of state in [place]. Was unable to get health insurance in that state. Moved back to [place] 03/10/2022. Did not know to transfer [place] prior Rx [facility] [place] to [place] [facility], so she has been w/o levothyroxine for a few months    1) hypothyroidism due to Hashimoto's thyroiditis:   Diagnosed at age 20  - reports a h/o of not consistently taking her medication but better compliance now  - no known family h/o thyroid disease    Current Dose: had been taking levothyroxine 125 mcg  - has been out of med for a few months  - is not on high dose biotin    2) h/o gestational diabetes mellitus:  - gestational diabetes with second pregnancy, diet controlled  - was diagnosed with gestational DM at the end of third pregnancy in 04/2019. Was treated by her OB with metformin   - Last A1C% 5.3 in 10/2019    Review of Systems   Constitutional: Positive for fatigue.        Wt stable   HENT: Negative.    Eyes: Negative.    Respiratory: Negative.    Cardiovascular: Negative for palpitations.   Gastrointestinal: Negative.  Negative for nausea.   Endocrine: Negative.    Genitourinary: Negative.    Musculoskeletal: Negative.    Skin: Negative.    Allergic/Immunologic: Negative.    Neurological: Negative.    Hematological: Negative.    Psychiatric/Behavioral: Positive for sleep disturbance.       The following portions of the patient's history were reviewed and updated as appropriate: allergies, current medications, past family history, past medical history, past social history, past surgical history and problem list.      /66   Pulse 86   Ht 160 cm (63\")   Wt 79.8 kg (176 lb)   SpO2 98%   BMI 31.18 kg/m²   Physical Exam   Constitutional: She is oriented to " person, place, and time. She appears well-developed. No distress.   HENT:   Head: Normocephalic.   Eyes: Pupils are equal, round, and reactive to light. Conjunctivae are normal.   Neck: No tracheal deviation present. No thyromegaly present.   No palpable thyroid nodules     Cardiovascular: Normal rate, regular rhythm and normal heart sounds.   No murmur heard.  Pulmonary/Chest: Effort normal and breath sounds normal. No respiratory distress.   Lymphadenopathy:     She has no cervical adenopathy.   Neurological: She is alert and oriented to person, place, and time. No cranial nerve deficit.   Skin: Skin is warm and dry. She is not diaphoretic. No erythema.   Psychiatric: Her behavior is normal.   Vitals reviewed.    LABS/IMAGING: outside records reviewed and summarized in HPI    ASSESSMENT/PLAN:  1) hypothyroidism due to Hashimoto's thyroiditis:  - Did not check TSH today as she has been w/o levothyroxine  - Sent new Rx for levothyroxine to 125 mcg QAM.  - Reviewed proper thyroid hormone administration, and factors to avoid that decrease medication potency and medication absorption.   - Will check TSH in 2 months and adjust dose as indicated    2) Healthcare maintenance:  -  gestational diabetes with second pregnancy, diet controlled  - was diagnosed with gestational DM at the end of third pregnancy in 04/2019. Was treated by her OB with metformin   - Will check A1C% in 2 months with her other lab    Pt to RTC in 6 months      Signed: Liliane Henry MD      ADDENDUM: (09/15/2022) patient contacted me via [employer] regarding symptoms of increased depression, insomnia for the past few months. She denies suicidal ideations at this time. She reports having depression intermittently. She recalls being on an antidepressant briefly 5 years ago after the birth of her first child.  She does not recall the type of antidepressant she was on. She does not have a primary care at this time and is requesting a referral to behavioral  health.   Referral order placed for [facility].   Signed: Liliane Henry MD

## 2022-06-24 ENCOUNTER — LAB (OUTPATIENT)
Dept: ENDOCRINOLOGY | Facility: CLINIC | Age: 32
End: 2022-06-24

## 2022-06-24 ENCOUNTER — LAB (OUTPATIENT)
Dept: LAB | Facility: HOSPITAL | Age: 32
End: 2022-06-24

## 2022-06-24 DIAGNOSIS — E06.3 HYPOTHYROIDISM DUE TO HASHIMOTO'S THYROIDITIS: ICD-10-CM

## 2022-06-24 DIAGNOSIS — E03.8 HYPOTHYROIDISM DUE TO HASHIMOTO'S THYROIDITIS: ICD-10-CM

## 2022-06-24 DIAGNOSIS — Z00.00 HEALTHCARE MAINTENANCE: ICD-10-CM

## 2022-06-24 LAB
HBA1C MFR BLD: 5.9 % (ref 4.8–5.6)
TSH SERPL DL<=0.05 MIU/L-ACNC: 7.41 UIU/ML (ref 0.27–4.2)

## 2022-06-24 PROCEDURE — 83036 HEMOGLOBIN GLYCOSYLATED A1C: CPT | Performed by: INTERNAL MEDICINE

## 2022-06-24 PROCEDURE — 84443 ASSAY THYROID STIM HORMONE: CPT | Performed by: INTERNAL MEDICINE

## 2022-06-28 DIAGNOSIS — E06.3 HYPOTHYROIDISM DUE TO HASHIMOTO'S THYROIDITIS: Primary | ICD-10-CM

## 2022-06-28 DIAGNOSIS — E03.8 HYPOTHYROIDISM DUE TO HASHIMOTO'S THYROIDITIS: Primary | ICD-10-CM

## 2022-06-28 RX ORDER — LEVOTHYROXINE SODIUM 137 UG/1
137 TABLET ORAL EVERY MORNING
Qty: 90 TABLET | Refills: 1 | Status: SHIPPED | OUTPATIENT
Start: 2022-06-28 | End: 2023-01-02 | Stop reason: SDUPTHER

## 2022-09-15 DIAGNOSIS — F32.A DEPRESSION, UNSPECIFIED DEPRESSION TYPE: Primary | ICD-10-CM

## 2023-01-02 DIAGNOSIS — E03.8 HYPOTHYROIDISM DUE TO HASHIMOTO'S THYROIDITIS: ICD-10-CM

## 2023-01-02 DIAGNOSIS — E06.3 HYPOTHYROIDISM DUE TO HASHIMOTO'S THYROIDITIS: ICD-10-CM

## 2023-01-03 RX ORDER — LEVOTHYROXINE SODIUM 137 UG/1
137 TABLET ORAL EVERY MORNING
Qty: 90 TABLET | Refills: 1 | Status: SHIPPED | OUTPATIENT
Start: 2023-01-03

## 2023-01-18 ENCOUNTER — OFFICE VISIT (OUTPATIENT)
Dept: ENDOCRINOLOGY | Facility: CLINIC | Age: 33
End: 2023-01-18
Payer: COMMERCIAL

## 2023-01-18 VITALS
HEIGHT: 63 IN | WEIGHT: 177 LBS | HEART RATE: 76 BPM | DIASTOLIC BLOOD PRESSURE: 70 MMHG | SYSTOLIC BLOOD PRESSURE: 116 MMHG | BODY MASS INDEX: 31.36 KG/M2 | OXYGEN SATURATION: 98 %

## 2023-01-18 DIAGNOSIS — F41.8 DEPRESSION WITH ANXIETY: ICD-10-CM

## 2023-01-18 DIAGNOSIS — Z00.00 HEALTHCARE MAINTENANCE: ICD-10-CM

## 2023-01-18 DIAGNOSIS — E06.3 HYPOTHYROIDISM DUE TO HASHIMOTO'S THYROIDITIS: Primary | ICD-10-CM

## 2023-01-18 DIAGNOSIS — E03.8 HYPOTHYROIDISM DUE TO HASHIMOTO'S THYROIDITIS: Primary | ICD-10-CM

## 2023-01-18 LAB
ALBUMIN SERPL-MCNC: 4.3 G/DL (ref 3.5–5.2)
ALBUMIN/GLOB SERPL: 1.4 G/DL
ALP SERPL-CCNC: 66 U/L (ref 39–117)
ALT SERPL W P-5'-P-CCNC: 29 U/L (ref 1–33)
ANION GAP SERPL CALCULATED.3IONS-SCNC: 8.9 MMOL/L (ref 5–15)
AST SERPL-CCNC: 20 U/L (ref 1–32)
BILIRUB SERPL-MCNC: <0.2 MG/DL (ref 0–1.2)
BUN SERPL-MCNC: 16 MG/DL (ref 6–20)
BUN/CREAT SERPL: 22.5 (ref 7–25)
CALCIUM SPEC-SCNC: 9.6 MG/DL (ref 8.6–10.5)
CHLORIDE SERPL-SCNC: 104 MMOL/L (ref 98–107)
CO2 SERPL-SCNC: 27.1 MMOL/L (ref 22–29)
CREAT SERPL-MCNC: 0.71 MG/DL (ref 0.57–1)
DEPRECATED RDW RBC AUTO: 37.7 FL (ref 37–54)
EGFRCR SERPLBLD CKD-EPI 2021: 117.5 ML/MIN/1.73
ERYTHROCYTE [DISTWIDTH] IN BLOOD BY AUTOMATED COUNT: 11.9 % (ref 12.3–15.4)
GLOBULIN UR ELPH-MCNC: 3.1 GM/DL
GLUCOSE SERPL-MCNC: 70 MG/DL (ref 65–99)
HBA1C MFR BLD: 5.4 % (ref 4.8–5.6)
HCT VFR BLD AUTO: 40.5 % (ref 34–46.6)
HGB BLD-MCNC: 13.8 G/DL (ref 12–15.9)
MCH RBC QN AUTO: 29.1 PG (ref 26.6–33)
MCHC RBC AUTO-ENTMCNC: 34.1 G/DL (ref 31.5–35.7)
MCV RBC AUTO: 85.3 FL (ref 79–97)
PLATELET # BLD AUTO: 267 10*3/MM3 (ref 140–450)
PMV BLD AUTO: 11.6 FL (ref 6–12)
POTASSIUM SERPL-SCNC: 4.1 MMOL/L (ref 3.5–5.2)
PROT SERPL-MCNC: 7.4 G/DL (ref 6–8.5)
RBC # BLD AUTO: 4.75 10*6/MM3 (ref 3.77–5.28)
SODIUM SERPL-SCNC: 140 MMOL/L (ref 136–145)
TSH SERPL DL<=0.05 MIU/L-ACNC: 1.33 UIU/ML (ref 0.27–4.2)
WBC NRBC COR # BLD: 8.3 10*3/MM3 (ref 3.4–10.8)

## 2023-01-18 PROCEDURE — 83036 HEMOGLOBIN GLYCOSYLATED A1C: CPT | Performed by: INTERNAL MEDICINE

## 2023-01-18 PROCEDURE — 80050 GENERAL HEALTH PANEL: CPT | Performed by: INTERNAL MEDICINE

## 2023-01-18 PROCEDURE — 99213 OFFICE O/P EST LOW 20 MIN: CPT | Performed by: INTERNAL MEDICINE

## 2023-01-18 NOTE — PROGRESS NOTES
Chief Complaint   Patient presents with   • Hypothyroidism     Follow up        HPI:   Nicole Jose is a 32 y.o.female who returns to [facility]ocrine Clinic for f/u evaluation of her hypothyroidism. Last visit 04/04/2022. Her history is as follows:    Interim Events:   (09/15/2022) patient contacted me via [employer] regarding symptoms of increased depression, insomnia for the past few months. She requested a referral to behavioral health. We sent referral twice but pt never received an appointment.    She denies suicidal ideations at this time. She reports having depression intermittently. She recalls being on an antidepressant briefly 5 years ago after the birth of her first child.  She does not recall the type of antidepressant she was on. She does not have a primary care at this time and is requesting a referral to behavioral health.       - Patient's mother recently diagnosed with breast cancer  - Patient has been taking her levothyroxine consistently and as directed    1) hypothyroidism due to Hashimoto's thyroiditis:   Diagnosed at age 20  - reports a h/o of not consistently taking her medication but better compliance now  - no known family h/o thyroid disease    Current Dose: levothyroxine 137 mcg  - Is taking 7 tablets once weekly as directed.  Patient had difficulty with taking medication consistently with daily dosing.  Discussed at prior visits that due to the long half-life of levothyroxine, 7 tablets once weekly is possible.  Patient has been able to take medication much more consistently with this dosing.  - is not on high dose biotin    2) h/o gestational diabetes mellitus:  - gestational diabetes with second pregnancy, diet controlled  - was diagnosed with gestational DM at the end of third pregnancy in 04/2019. Was treated by her OB with metformin   - Her A1c in June 2022 was 5.90%  - A1c today is 5.4%    Review of Systems   Constitutional: Positive for fatigue.        Wt stable   HENT: Negative.    Eyes:  "Negative.    Respiratory: Negative.    Cardiovascular: Negative for palpitations.   Gastrointestinal: Negative.  Negative for nausea.   Endocrine: Negative.    Genitourinary: Negative.    Musculoskeletal: Negative.    Skin: Negative.    Allergic/Immunologic: Negative.    Neurological: Negative.    Hematological: Negative.    Psychiatric/Behavioral: Positive for sleep disturbance.        Depression with anxiety       The following portions of the patient's history were reviewed and updated as appropriate: allergies, current medications, past family history, past medical history, past social history, past surgical history and problem list.      /70   Pulse 76   Ht 160 cm (63\")   Wt 80.3 kg (177 lb)   SpO2 98%   BMI 31.35 kg/m²   Physical Exam   Constitutional: She is oriented to person, place, and time. She appears well-developed. No distress.   HENT:   Head: Normocephalic.   Eyes: Pupils are equal, round, and reactive to light. Conjunctivae are normal.   Neck: No tracheal deviation present. No thyromegaly present.   No palpable thyroid nodules     Cardiovascular: Normal rate, regular rhythm and normal heart sounds.   No murmur heard.  Pulmonary/Chest: Effort normal and breath sounds normal. No respiratory distress.   Lymphadenopathy:     She has no cervical adenopathy.   Neurological: She is alert and oriented to person, place, and time. No cranial nerve deficit.   Skin: Skin is warm and dry. She is not diaphoretic. No erythema.   Psychiatric: Her behavior is normal.   Vitals reviewed.    LABS/IMAGING: outside records reviewed and summarized in HPI  Office Visit on 01/18/2023   Component Date Value Ref Range Status   • TSH 01/18/2023 1.330  0.270 - 4.200 uIU/mL Final   • Hemoglobin A1C 01/18/2023 5.40  4.80 - 5.60 % Final   • WBC 01/18/2023 8.30  3.40 - 10.80 10*3/mm3 Final   • RBC 01/18/2023 4.75  3.77 - 5.28 10*6/mm3 Final   • Hemoglobin 01/18/2023 13.8  12.0 - 15.9 g/dL Final   • Hematocrit 01/18/2023 " 40.5  34.0 - 46.6 % Final   • MCV 01/18/2023 85.3  79.0 - 97.0 fL Final   • MCH 01/18/2023 29.1  26.6 - 33.0 pg Final   • MCHC 01/18/2023 34.1  31.5 - 35.7 g/dL Final   • RDW 01/18/2023 11.9 (L)  12.3 - 15.4 % Final   • RDW-SD 01/18/2023 37.7  37.0 - 54.0 fl Final   • MPV 01/18/2023 11.6  6.0 - 12.0 fL Final   • Platelets 01/18/2023 267  140 - 450 10*3/mm3 Final   • Glucose 01/18/2023 70  65 - 99 mg/dL Final   • BUN 01/18/2023 16  6 - 20 mg/dL Final   • Creatinine 01/18/2023 0.71  0.57 - 1.00 mg/dL Final   • Sodium 01/18/2023 140  136 - 145 mmol/L Final   • Potassium 01/18/2023 4.1  3.5 - 5.2 mmol/L Final   • Chloride 01/18/2023 104  98 - 107 mmol/L Final   • CO2 01/18/2023 27.1  22.0 - 29.0 mmol/L Final   • Calcium 01/18/2023 9.6  8.6 - 10.5 mg/dL Final   • Total Protein 01/18/2023 7.4  6.0 - 8.5 g/dL Final   • Albumin 01/18/2023 4.3  3.5 - 5.2 g/dL Final   • ALT (SGPT) 01/18/2023 29  1 - 33 U/L Final   • AST (SGOT) 01/18/2023 20  1 - 32 U/L Final   • Alkaline Phosphatase 01/18/2023 66  39 - 117 U/L Final   • Total Bilirubin 01/18/2023 <0.2  0.0 - 1.2 mg/dL Final   • Globulin 01/18/2023 3.1  gm/dL Final   • A/G Ratio 01/18/2023 1.4  g/dL Final   • BUN/Creatinine Ratio 01/18/2023 22.5  7.0 - 25.0 Final   • Anion Gap 01/18/2023 8.9  5.0 - 15.0 mmol/L Final   • eGFR 01/18/2023 117.5  >60.0 mL/min/1.73 Final       ASSESSMENT/PLAN:  1) hypothyroidism due to Hashimoto's thyroiditis:  - Patient was clinically euthyroid on exam  - Continue dose of levothyroxine 137 mcg  - Reviewed proper thyroid hormone administration, and factors to avoid that decrease medication potency and medication absorption.     2) Healthcare maintenance:  -  H/o gestational diabetes with second pregnancy, diet controlled. Was diagnosed with gestational DM at the end of third pregnancy in 04/2019. Was treated by her OB with metformin   - Her A1c in June 2022 was 5.90%  - A1c today is 5.4%    3) depression with anxiety:  (09/15/2022) patient  contacted me via [employer] regarding symptoms of increased depression, insomnia for the past few months. She requested a referral to behavioral health. We sent referral twice but pt never received an appointment.    She denies suicidal ideations at this time. She reports having depression intermittently. She recalls being on an antidepressant briefly 5 years ago after the birth of her first child.  She does not recall the type of antidepressant she was on. She does not have a primary care at this time and is requesting a referral to behavioral health.   Referral order placed for behavioral health again    Pt to RTC in 6 months      Signed: Liliane Henry MD

## 2023-07-18 ENCOUNTER — OFFICE VISIT (OUTPATIENT)
Dept: ENDOCRINOLOGY | Facility: CLINIC | Age: 33
End: 2023-07-18
Payer: COMMERCIAL

## 2023-07-18 VITALS
OXYGEN SATURATION: 98 % | SYSTOLIC BLOOD PRESSURE: 120 MMHG | WEIGHT: 177 LBS | BODY MASS INDEX: 31.36 KG/M2 | HEART RATE: 74 BPM | DIASTOLIC BLOOD PRESSURE: 76 MMHG | HEIGHT: 63 IN

## 2023-07-18 DIAGNOSIS — Z00.00 HEALTHCARE MAINTENANCE: ICD-10-CM

## 2023-07-18 DIAGNOSIS — E03.8 HYPOTHYROIDISM DUE TO HASHIMOTO'S THYROIDITIS: Primary | ICD-10-CM

## 2023-07-18 DIAGNOSIS — E06.3 HYPOTHYROIDISM DUE TO HASHIMOTO'S THYROIDITIS: Primary | ICD-10-CM

## 2023-07-18 PROBLEM — F31.81 BIPOLAR 2 DISORDER: Status: ACTIVE | Noted: 2023-07-18

## 2023-07-18 PROCEDURE — 99213 OFFICE O/P EST LOW 20 MIN: CPT | Performed by: INTERNAL MEDICINE

## 2023-07-18 RX ORDER — QUETIAPINE FUMARATE 100 MG/1
100 TABLET, FILM COATED ORAL NIGHTLY
COMMUNITY

## 2023-07-18 NOTE — PROGRESS NOTES
Chief Complaint   Patient presents with    Hypothyroidism     Follow up        HPI:   Nicole Jose is a 32 y.o.female who returns to Endocrine Clinic for f/u evaluation of her hypothyroidism. Last visit 01/18/2023. Her history is as follows:    Interim Events:   - Pt seeing FLORENCE You at [facility]. Was diagnosed with bipolar 2 disorder. Is now taking Seroquel 100 mg nightly. Pt stated Ms. Nagy requested if an Hgb A1C% could be added to her labs today.  - Patient has been taking her levothyroxine consistently and as directed. Pt reports overall feeling well on this dose.    1) hypothyroidism due to Hashimoto's thyroiditis:   Diagnosed at age 20  - reports a h/o of not consistently taking her medication but better compliance now  - no known family h/o thyroid disease    Current Dose: levothyroxine 137 mcg  - Is taking 7 tablets once weekly as directed.  Patient had difficulty with taking medication consistently with daily dosing.  Discussed at prior visits that due to the long half-life of levothyroxine, 7 tablets once weekly is possible.  Patient has been able to take medication much more consistently with this dosing.  - is not on high dose biotin    2) h/o gestational diabetes mellitus:  - gestational diabetes with second pregnancy, diet controlled  - was diagnosed with gestational DM at the end of third pregnancy in 04/2019. Was treated by her OB with metformin   - Her A1c in June 2022 was 5.90%  - (01/2023) A1c 5.4%    Review of Systems   Constitutional:  Negative for fatigue.        Wt stable   HENT: Negative.     Eyes: Negative.    Respiratory: Negative.     Cardiovascular:  Negative for palpitations.   Gastrointestinal: Negative.  Negative for nausea.   Endocrine: Negative.    Genitourinary: Negative.    Musculoskeletal: Negative.    Skin: Negative.    Allergic/Immunologic: Negative.    Neurological: Negative.    Hematological: Negative.    Psychiatric/Behavioral:  Positive for sleep disturbance.  "        H/o Depression with anxiety     The following portions of the patient's history were reviewed and updated as appropriate: allergies, current medications, past family history, past medical history, past social history, past surgical history and problem list.    /76   Pulse 74   Ht 160 cm (63\")   Wt 80.3 kg (177 lb)   SpO2 98%   BMI 31.35 kg/m²   Physical Exam   Constitutional: She is oriented to person, place, and time. She appears well-developed. No distress.   HENT:   Head: Normocephalic.   Eyes: Pupils are equal, round, and reactive to light. Conjunctivae are normal.   Neck: No tracheal deviation present. No thyromegaly present.   No palpable thyroid nodules     Cardiovascular: Normal rate, regular rhythm and normal heart sounds.   No murmur heard.  Pulmonary/Chest: Effort normal and breath sounds normal. No respiratory distress.   Lymphadenopathy:     She has no cervical adenopathy.   Neurological: She is alert and oriented to person, place, and time. No cranial nerve deficit.   Skin: Skin is warm and dry. She is not diaphoretic. No erythema.   Psychiatric: Her behavior is normal.   Vitals reviewed.    LABS/IMAGING: outside records reviewed and summarized in HPI  (07/18/2023) TSH, Hgb A1C% ordered today. Pt left clinic w/o labs collected. Stated she would come back later to complete labs    Lab Results   Component Value Date    TSH 1.330 01/18/2023     Lab Results   Component Value Date    HGBA1C 5.40 01/18/2023     Lab Results   Component Value Date    GLUCOSE 70 01/18/2023    BUN 16 01/18/2023    CREATININE 0.71 01/18/2023    EGFR 117.5 01/18/2023    BCR 22.5 01/18/2023    K 4.1 01/18/2023    CO2 27.1 01/18/2023    CALCIUM 9.6 01/18/2023    ALBUMIN 4.3 01/18/2023    BILITOT <0.2 01/18/2023    AST 20 01/18/2023    ALT 29 01/18/2023       ASSESSMENT/PLAN:  1) hypothyroidism due to Hashimoto's thyroiditis:  - Patient was clinically euthyroid on exam  - Continue dose of levothyroxine 137 mcg  - " TSH level ordered, result pending  - Reviewed proper thyroid hormone administration, and factors to avoid that decrease medication potency and medication absorption.     2) Healthcare maintenance:  -  H/o gestational diabetes with second pregnancy, diet controlled. Was diagnosed with gestational DM at the end of third pregnancy in 04/2019. Was treated by her OB with metformin   - Her A1c in June 2022 was 5.90%  - (01/2023) A1c 5.4%    (07/18/2023) TSH, Hgb A1C% ordered today. Pt left clinic w/o labs collected. Stated she would come back later to complete labs. As of 07/28/2023, labs not completed    Pt to RTC in 6 months      Signed: Liliane Henry MD

## 2023-07-18 NOTE — LETTER
July 28, 2023     FLORENCE Bee  [address]  [address]  [place] [address]    Patient: Nicole Jose   YOB: 1990   Date of Visit: 7/18/2023     Dear FLORENCE Bee:     I had the pleasure of seeing your patient, Nicole Jose.  Below are the relevant portions of my assessment and plan of care.    If you have questions, please do not hesitate to call me.      Sincerely,        Liliane Henry MD        CC: No Recipients    Liliane Henry MD  07/28/23 9447  Sign when Signing Visit  Chief Complaint   Patient presents with   • Hypothyroidism     Follow up        HPI:   Nicole Jose is a 32 y.o.female who returns to Endocrine Clinic for f/u evaluation of her hypothyroidism. Last visit 01/18/2023. Her history is as follows:    Interim Events:   - Pt seeing FLORENCE You at [facility]. Was diagnosed with bipolar 2 disorder. Is now taking Seroquel 100 mg nightly. Pt stated Ms. Nagy requested if an Hgb A1C% could be added to her labs today.  - Patient has been taking her levothyroxine consistently and as directed. Pt reports overall feeling well on this dose.    1) hypothyroidism due to Hashimoto's thyroiditis:   Diagnosed at age 20  - reports a h/o of not consistently taking her medication but better compliance now  - no known family h/o thyroid disease    Current Dose: levothyroxine 137 mcg  - Is taking 7 tablets once weekly as directed.  Patient had difficulty with taking medication consistently with daily dosing.  Discussed at prior visits that due to the long half-life of levothyroxine, 7 tablets once weekly is possible.  Patient has been able to take medication much more consistently with this dosing.  - is not on high dose biotin    2) h/o gestational diabetes mellitus:  - gestational diabetes with second pregnancy, diet controlled  - was diagnosed with gestational DM at the end of third pregnancy in 04/2019. Was treated by her OB with metformin   - Her A1c in June 2022  "was 5.90%  - (01/2023) A1c 5.4%    Review of Systems   Constitutional:  Negative for fatigue.        Wt stable   HENT: Negative.     Eyes: Negative.    Respiratory: Negative.     Cardiovascular:  Negative for palpitations.   Gastrointestinal: Negative.  Negative for nausea.   Endocrine: Negative.    Genitourinary: Negative.    Musculoskeletal: Negative.    Skin: Negative.    Allergic/Immunologic: Negative.    Neurological: Negative.    Hematological: Negative.    Psychiatric/Behavioral:  Positive for sleep disturbance.         H/o Depression with anxiety     The following portions of the patient's history were reviewed and updated as appropriate: allergies, current medications, past family history, past medical history, past social history, past surgical history and problem list.    /76   Pulse 74   Ht 160 cm (63\")   Wt 80.3 kg (177 lb)   SpO2 98%   BMI 31.35 kg/m²   Physical Exam   Constitutional: She is oriented to person, place, and time. She appears well-developed. No distress.   HENT:   Head: Normocephalic.   Eyes: Pupils are equal, round, and reactive to light. Conjunctivae are normal.   Neck: No tracheal deviation present. No thyromegaly present.   No palpable thyroid nodules     Cardiovascular: Normal rate, regular rhythm and normal heart sounds.   No murmur heard.  Pulmonary/Chest: Effort normal and breath sounds normal. No respiratory distress.   Lymphadenopathy:     She has no cervical adenopathy.   Neurological: She is alert and oriented to person, place, and time. No cranial nerve deficit.   Skin: Skin is warm and dry. She is not diaphoretic. No erythema.   Psychiatric: Her behavior is normal.   Vitals reviewed.    LABS/IMAGING: outside records reviewed and summarized in HPI  (07/18/2023) TSH, Hgb A1C% ordered today. Pt left clinic w/o labs collected. Stated she would come back later to complete labs    Lab Results   Component Value Date    TSH 1.330 01/18/2023     Lab Results   Component " Value Date    HGBA1C 5.40 01/18/2023     Lab Results   Component Value Date    GLUCOSE 70 01/18/2023    BUN 16 01/18/2023    CREATININE 0.71 01/18/2023    EGFR 117.5 01/18/2023    BCR 22.5 01/18/2023    K 4.1 01/18/2023    CO2 27.1 01/18/2023    CALCIUM 9.6 01/18/2023    ALBUMIN 4.3 01/18/2023    BILITOT <0.2 01/18/2023    AST 20 01/18/2023    ALT 29 01/18/2023       ASSESSMENT/PLAN:  1) hypothyroidism due to Hashimoto's thyroiditis:  - Patient was clinically euthyroid on exam  - Continue dose of levothyroxine 137 mcg  - TSH level ordered, result pending  - Reviewed proper thyroid hormone administration, and factors to avoid that decrease medication potency and medication absorption.     2) Healthcare maintenance:  -  H/o gestational diabetes with second pregnancy, diet controlled. Was diagnosed with gestational DM at the end of third pregnancy in 04/2019. Was treated by her OB with metformin   - Her A1c in June 2022 was 5.90%  - (01/2023) A1c 5.4%    (07/18/2023) TSH, Hgb A1C% ordered today. Pt left clinic w/o labs collected. Stated she would come back later to complete labs. As of 07/28/2023, labs not completed    Pt to RTC in 6 months      Signed: Liliane Henry MD

## 2023-07-24 ENCOUNTER — PATIENT MESSAGE (OUTPATIENT)
Dept: ENDOCRINOLOGY | Facility: CLINIC | Age: 33
End: 2023-07-24
Payer: COMMERCIAL

## 2023-07-31 ENCOUNTER — LAB (OUTPATIENT)
Dept: LAB | Facility: HOSPITAL | Age: 33
End: 2023-07-31
Payer: COMMERCIAL

## 2023-07-31 LAB
HBA1C MFR BLD: 5.5 % (ref 4.8–5.6)
TSH SERPL DL<=0.05 MIU/L-ACNC: 2.36 UIU/ML (ref 0.27–4.2)

## 2023-07-31 PROCEDURE — 84443 ASSAY THYROID STIM HORMONE: CPT | Performed by: INTERNAL MEDICINE

## 2023-07-31 PROCEDURE — 83036 HEMOGLOBIN GLYCOSYLATED A1C: CPT | Performed by: INTERNAL MEDICINE

## 2023-12-01 ENCOUNTER — PATIENT MESSAGE (OUTPATIENT)
Dept: ENDOCRINOLOGY | Facility: CLINIC | Age: 33
End: 2023-12-01
Payer: COMMERCIAL

## 2023-12-07 DIAGNOSIS — E03.8 HYPOTHYROIDISM DUE TO HASHIMOTO'S THYROIDITIS: Primary | ICD-10-CM

## 2023-12-07 DIAGNOSIS — E06.3 HYPOTHYROIDISM DUE TO HASHIMOTO'S THYROIDITIS: Primary | ICD-10-CM

## 2023-12-07 RX ORDER — LEVOTHYROXINE SODIUM 0.15 MG/1
150 TABLET ORAL EVERY MORNING
Qty: 90 TABLET | Refills: 1 | Status: SHIPPED | OUTPATIENT
Start: 2023-12-07

## 2023-12-17 DIAGNOSIS — E03.8 HYPOTHYROIDISM DUE TO HASHIMOTO'S THYROIDITIS: ICD-10-CM

## 2023-12-17 DIAGNOSIS — E06.3 HYPOTHYROIDISM DUE TO HASHIMOTO'S THYROIDITIS: ICD-10-CM

## 2023-12-18 RX ORDER — LEVOTHYROXINE SODIUM 137 UG/1
137 TABLET ORAL EVERY MORNING
Qty: 90 TABLET | Refills: 1 | Status: SHIPPED | OUTPATIENT
Start: 2023-12-18

## 2024-01-22 ENCOUNTER — OFFICE VISIT (OUTPATIENT)
Dept: ENDOCRINOLOGY | Facility: CLINIC | Age: 34
End: 2024-01-22
Payer: COMMERCIAL

## 2024-01-22 VITALS
WEIGHT: 180 LBS | HEART RATE: 82 BPM | SYSTOLIC BLOOD PRESSURE: 116 MMHG | BODY MASS INDEX: 31.89 KG/M2 | OXYGEN SATURATION: 98 % | HEIGHT: 63 IN | DIASTOLIC BLOOD PRESSURE: 70 MMHG

## 2024-01-22 DIAGNOSIS — E03.8 HYPOTHYROIDISM DUE TO HASHIMOTO'S THYROIDITIS: Primary | ICD-10-CM

## 2024-01-22 DIAGNOSIS — E06.3 HYPOTHYROIDISM DUE TO HASHIMOTO'S THYROIDITIS: Primary | ICD-10-CM

## 2024-01-22 DIAGNOSIS — E55.9 VITAMIN D DEFICIENCY: ICD-10-CM

## 2024-01-22 PROCEDURE — 84443 ASSAY THYROID STIM HORMONE: CPT | Performed by: INTERNAL MEDICINE

## 2024-01-22 PROCEDURE — 82306 VITAMIN D 25 HYDROXY: CPT | Performed by: INTERNAL MEDICINE

## 2024-01-22 PROCEDURE — 99213 OFFICE O/P EST LOW 20 MIN: CPT | Performed by: INTERNAL MEDICINE

## 2024-01-22 NOTE — PROGRESS NOTES
Chief Complaint   Patient presents with    Hypothyroidism     Follow up       HPI:   Nicole Jose is a 33 y.o.female who returns to Endocrine Clinic for f/u evaluation of her hypothyroidism. Last visit 07/18/2023. Her history is as follows:    Interim Events:   - Is now seeing Dr. Ramana Laguerre at [facility] for her Bipolar 2 disorder. She started [place] CR in 12/2023. She reports feeling better on this medication.   - Patient has been taking her levothyroxine consistently and as directed. Pt reports overall feeling well on her current dose.  - Pt requesting to have her vit D level checked today. Is currently on a supplement.    1) hypothyroidism due to Hashimoto's thyroiditis:   Diagnosed at age 20  - reports a h/o of not consistently taking her medication but better compliance now  - no known family h/o thyroid disease    Current Dose: levothyroxine 150 mcg  - Is taking 7 tablets once weekly as directed.  Patient had difficulty with taking medication consistently with daily dosing.  Discussed at prior visits that due to the long half-life of levothyroxine, 7 tablets once weekly is possible.  Patient has been able to take medication much more consistently with this dosing.  - is not on high dose biotin    2) h/o gestational diabetes mellitus:  - gestational diabetes with second pregnancy, diet controlled  - was diagnosed with gestational DM at the end of third pregnancy in 04/2019. Was treated by her OB with metformin   - Her A1c in June 2022 was 5.90%  - (07/2023) A1c 5.50%    3) vitamin D deficiency:  - Pt requesting to have her vit D level checked today. Is currently taking Vit D3 125 mcg (5,000 IU) daily.    Other History:  - h/o Bipolar 2 disorder diagnosed in 2023. Followed by Dr. Ramana Laguerre at [facility]. She started [place] CR in 12/2023.      Review of Systems   Constitutional:  Negative for fatigue.        Wt stable   HENT: Negative.     Eyes: Negative.    Respiratory: Negative.     Cardiovascular:   "Negative for palpitations.   Gastrointestinal: Negative.  Negative for nausea.   Endocrine: Negative.    Genitourinary: Negative.    Musculoskeletal: Negative.    Skin: Negative.    Allergic/Immunologic: Negative.    Neurological: Negative.    Hematological: Negative.    Psychiatric/Behavioral:  Positive for sleep disturbance.         H/o Depression with anxiety     The following portions of the patient's history were reviewed and updated as appropriate: allergies, current medications, past family history, past medical history, past social history, past surgical history and problem list.    /70   Pulse 82   Ht 160 cm (63\")   Wt 81.6 kg (180 lb)   SpO2 98%   BMI 31.89 kg/m²   Physical Exam   Constitutional: She is oriented to person, place, and time. She appears well-developed. No distress.   HENT:   Head: Normocephalic.   Eyes: Pupils are equal, round, and reactive to light. Conjunctivae are normal.   Neck: No tracheal deviation present. No thyromegaly present.   No palpable thyroid nodules     Cardiovascular: Normal rate, regular rhythm and normal heart sounds.   No murmur heard.  Pulmonary/Chest: Effort normal and breath sounds normal. No respiratory distress.   Lymphadenopathy:     She has no cervical adenopathy.   Neurological: She is alert and oriented to person, place, and time. No cranial nerve deficit.   Skin: Skin is warm and dry. She is not diaphoretic. No erythema.   Psychiatric: Her behavior is normal.   Vitals reviewed.    LABS/IMAGING: outside records reviewed and summarized in HPI  Office Visit on 01/22/2024   Component Date Value Ref Range Status    TSH 01/22/2024 2.670  0.270 - 4.200 uIU/mL Final    25 Hydroxy, Vitamin D 01/22/2024 29.9 (L)  30.0 - 100.0 ng/ml Final       ASSESSMENT/PLAN:  1) hypothyroidism due to Hashimoto's thyroiditis:  - Patient was clinically euthyroid on exam  - Continue dose of levothyroxine 150 mcg  - Is taking 7 tablets once weekly as directed. Patient had " difficulty with taking medication consistently with daily dosing.  Discussed at prior visits that due to the long half-life of levothyroxine, 7 tablets once weekly is possible.  Patient has been able to take medication much more consistently with this dosing.  - Reviewed proper thyroid hormone administration, and factors to avoid that decrease medication potency and medication absorption.     2) vitamin D deficiency:   Vit D 25-OH Level improving on on her current supplement. Advised pt to continue the vit D 25-OH 5000 IU daily.      Pt to RTC in 6 months    Electronically Signed: Liliane Henry MD

## 2024-01-23 LAB
25(OH)D3 SERPL-MCNC: 29.9 NG/ML (ref 30–100)
TSH SERPL DL<=0.05 MIU/L-ACNC: 2.67 UIU/ML (ref 0.27–4.2)

## 2024-06-19 DIAGNOSIS — E03.8 HYPOTHYROIDISM DUE TO HASHIMOTO'S THYROIDITIS: ICD-10-CM

## 2024-06-19 DIAGNOSIS — E06.3 HYPOTHYROIDISM DUE TO HASHIMOTO'S THYROIDITIS: ICD-10-CM

## 2024-06-19 RX ORDER — LEVOTHYROXINE SODIUM 0.15 MG/1
150 TABLET ORAL EVERY MORNING
Qty: 90 TABLET | Refills: 0 | Status: CANCELLED | OUTPATIENT
Start: 2024-06-19

## 2024-06-19 NOTE — TELEPHONE ENCOUNTER
Rx Refill Note    Requested Prescriptions     Pending Prescriptions Disp Refills    levothyroxine (SYNTHROID, LEVOTHROID) 150 MCG tablet 90 tablet 1     Sig: Take 1 tablet by mouth Every Morning.        Last office visit with prescribing clinician: 1/22/2024       Next office visit with prescribing clinician: 7/29/2024

## 2024-06-24 DIAGNOSIS — E06.3 HYPOTHYROIDISM DUE TO HASHIMOTO'S THYROIDITIS: ICD-10-CM

## 2024-06-24 DIAGNOSIS — E03.8 HYPOTHYROIDISM DUE TO HASHIMOTO'S THYROIDITIS: ICD-10-CM

## 2024-06-24 RX ORDER — LEVOTHYROXINE SODIUM 0.15 MG/1
150 TABLET ORAL EVERY MORNING
Qty: 90 TABLET | Refills: 0 | Status: SHIPPED | OUTPATIENT
Start: 2024-06-24

## 2024-06-24 NOTE — TELEPHONE ENCOUNTER
Rx Refill Note  Requested Prescriptions      No prescriptions requested or ordered in this encounter        Last office visit with prescribing clinician: 1/22/2024      Next office visit with prescribing clinician: 7/29/2024       Jaclyn Kovacs (Jodi)  06/24/24, 15:42 EDT

## 2024-06-24 NOTE — TELEPHONE ENCOUNTER
----- Message from [employer] sent at 6/24/2024 11:55 AM EDT -----  Regarding: Levo  Contact: [phone]  Levothyroxin 150 mcg

## 2024-07-29 ENCOUNTER — OFFICE VISIT (OUTPATIENT)
Dept: ENDOCRINOLOGY | Facility: CLINIC | Age: 34
End: 2024-07-29
Payer: COMMERCIAL

## 2024-07-29 VITALS
BODY MASS INDEX: 31.29 KG/M2 | OXYGEN SATURATION: 98 % | SYSTOLIC BLOOD PRESSURE: 114 MMHG | WEIGHT: 176.6 LBS | HEIGHT: 63 IN | DIASTOLIC BLOOD PRESSURE: 78 MMHG | HEART RATE: 78 BPM

## 2024-07-29 DIAGNOSIS — R73.09 ABNORMAL GLUCOSE: ICD-10-CM

## 2024-07-29 DIAGNOSIS — E06.3 HYPOTHYROIDISM DUE TO HASHIMOTO'S THYROIDITIS: Primary | ICD-10-CM

## 2024-07-29 DIAGNOSIS — E03.8 HYPOTHYROIDISM DUE TO HASHIMOTO'S THYROIDITIS: Primary | ICD-10-CM

## 2024-07-29 LAB
ALBUMIN SERPL-MCNC: 4.5 G/DL (ref 3.5–5.2)
ALBUMIN/GLOB SERPL: 1.6 G/DL
ALP SERPL-CCNC: 68 U/L (ref 39–117)
ALT SERPL W P-5'-P-CCNC: 19 U/L (ref 1–33)
ANION GAP SERPL CALCULATED.3IONS-SCNC: 10 MMOL/L (ref 5–15)
AST SERPL-CCNC: 16 U/L (ref 1–32)
BILIRUB SERPL-MCNC: 0.2 MG/DL (ref 0–1.2)
BUN SERPL-MCNC: 14 MG/DL (ref 6–20)
BUN/CREAT SERPL: 19.7 (ref 7–25)
CALCIUM SPEC-SCNC: 9.7 MG/DL (ref 8.6–10.5)
CHLORIDE SERPL-SCNC: 102 MMOL/L (ref 98–107)
CO2 SERPL-SCNC: 24 MMOL/L (ref 22–29)
CREAT SERPL-MCNC: 0.71 MG/DL (ref 0.57–1)
EGFRCR SERPLBLD CKD-EPI 2021: 116.7 ML/MIN/1.73
GLOBULIN UR ELPH-MCNC: 2.9 GM/DL
GLUCOSE SERPL-MCNC: 76 MG/DL (ref 65–99)
HBA1C MFR BLD: 5.5 % (ref 4.8–5.6)
POTASSIUM SERPL-SCNC: 4.2 MMOL/L (ref 3.5–5.2)
PROT SERPL-MCNC: 7.4 G/DL (ref 6–8.5)
SODIUM SERPL-SCNC: 136 MMOL/L (ref 136–145)
TSH SERPL DL<=0.05 MIU/L-ACNC: 2.83 UIU/ML (ref 0.27–4.2)

## 2024-07-29 PROCEDURE — 80053 COMPREHEN METABOLIC PANEL: CPT | Performed by: INTERNAL MEDICINE

## 2024-07-29 PROCEDURE — 84443 ASSAY THYROID STIM HORMONE: CPT | Performed by: INTERNAL MEDICINE

## 2024-07-29 PROCEDURE — 99214 OFFICE O/P EST MOD 30 MIN: CPT | Performed by: INTERNAL MEDICINE

## 2024-07-29 PROCEDURE — 83036 HEMOGLOBIN GLYCOSYLATED A1C: CPT | Performed by: INTERNAL MEDICINE

## 2024-07-29 RX ORDER — METHYLPHENIDATE HYDROCHLORIDE 10 MG/1
10 TABLET ORAL 2 TIMES DAILY
COMMUNITY
Start: 2024-06-07

## 2024-07-29 NOTE — PROGRESS NOTES
Chief Complaint   Patient presents with    Hypothyroidism     Follow-up       HPI:   Nicole Jose is a 33 y.o.female who returns to Endocrine Clinic for f/u evaluation of her hypothyroidism. Last visit 01/22/2024. Her history is as follows:    Interim Events:   - Is followed by Dr. Ramana Laguerre at [facility] for her Bipolar 2 disorder. He started [place] CR in 12/2023. She reports feeling better on this medication.   - Patient has been taking her levothyroxine consistently and as directed.   - Pt reports persistent fatigue. Also reports sleep disturbances. Has difficulty falling asleep (despite avoiding LED lighting prior to bedtime) and reports waking up frequently throughout the night    1) hypothyroidism due to Hashimoto's thyroiditis:   Diagnosed at age 20  - reports a h/o of not consistently taking her medication but better compliance now  - no known family h/o thyroid disease    Current Dose: levothyroxine 150 mcg  - Is taking 7 tablets once weekly as directed.  Patient had difficulty with taking medication consistently with daily dosing.  Discussed at prior visits that due to the long half-life of levothyroxine, 7 tablets once weekly is possible.  Patient has been able to take medication much more consistently with this dosing.  - is not on high dose biotin    2) h/o gestational diabetes mellitus:  - gestational diabetes with second pregnancy, diet controlled  - was diagnosed with gestational DM at the end of third pregnancy in 04/2019. Was treated by her OB with metformin   - Her A1c in June 2022 was 5.90%  - (07/2023) A1c 5.50%    3) difficulty sleeping:   - Pt reports persistent fatigue. Also reports sleep disturbances. Has difficulty falling asleep (despite avoiding LED lighting prior to bedtime) and reports waking up frequently throughout the night    Other History:  - h/o Bipolar 2 disorder diagnosed in 2023. Followed by Dr. Ramana Laguerre at [facility]. She started [place] CR in 12/2023.      Review of  "Systems   Constitutional:  Positive for fatigue.        Wt stable   HENT: Negative.     Eyes: Negative.    Respiratory: Negative.     Cardiovascular:  Negative for palpitations.   Gastrointestinal: Negative.  Negative for nausea.   Endocrine: Negative.    Genitourinary: Negative.    Musculoskeletal: Negative.    Skin: Negative.    Allergic/Immunologic: Negative.    Neurological: Negative.    Hematological: Negative.    Psychiatric/Behavioral:  Positive for sleep disturbance.         H/o Depression with anxiety     The following portions of the patient's history were reviewed and updated as appropriate: allergies, current medications, past family history, past medical history, past social history, past surgical history and problem list.    /78   Pulse 78   Ht 160 cm (62.99\")   Wt 80.1 kg (176 lb 9.6 oz)   SpO2 98%   Breastfeeding No   BMI 31.29 kg/m²   Physical Exam   Constitutional: She is oriented to person, place, and time. She appears well-developed. No distress.   HENT:   Head: Normocephalic.   Eyes: Pupils are equal, round, and reactive to light. Conjunctivae are normal.   Neck: No tracheal deviation present. No thyromegaly present.   No palpable thyroid nodules     Cardiovascular: Normal rate, regular rhythm and normal heart sounds.   No murmur heard.  Pulmonary/Chest: Effort normal and breath sounds normal. No respiratory distress.   Lymphadenopathy:     She has no cervical adenopathy.   Neurological: She is alert and oriented to person, place, and time. No cranial nerve deficit.   Skin: Skin is warm and dry. She is not diaphoretic. No erythema.   Psychiatric: Her behavior is normal.   Vitals reviewed.    LABS/IMAGING: outside records reviewed and summarized in HPI  Office Visit on 07/29/2024   Component Date Value Ref Range Status    TSH 07/29/2024 2.830  0.270 - 4.200 uIU/mL Final    Hemoglobin A1C 07/29/2024 5.50  4.80 - 5.60 % Final    Glucose 07/29/2024 76  65 - 99 mg/dL Final    BUN " 07/29/2024 14  6 - 20 mg/dL Final    Creatinine 07/29/2024 0.71  0.57 - 1.00 mg/dL Final    Sodium 07/29/2024 136  136 - 145 mmol/L Final    Potassium 07/29/2024 4.2  3.5 - 5.2 mmol/L Final    Chloride 07/29/2024 102  98 - 107 mmol/L Final    CO2 07/29/2024 24.0  22.0 - 29.0 mmol/L Final    Calcium 07/29/2024 9.7  8.6 - 10.5 mg/dL Final    Total Protein 07/29/2024 7.4  6.0 - 8.5 g/dL Final    Albumin 07/29/2024 4.5  3.5 - 5.2 g/dL Final    ALT (SGPT) 07/29/2024 19  1 - 33 U/L Final    AST (SGOT) 07/29/2024 16  1 - 32 U/L Final    Alkaline Phosphatase 07/29/2024 68  39 - 117 U/L Final    Total Bilirubin 07/29/2024 0.2  0.0 - 1.2 mg/dL Final    Globulin 07/29/2024 2.9  gm/dL Final    A/G Ratio 07/29/2024 1.6  g/dL Final    BUN/Creatinine Ratio 07/29/2024 19.7  7.0 - 25.0 Final    Anion Gap 07/29/2024 10.0  5.0 - 15.0 mmol/L Final    eGFR 07/29/2024 116.7  >60.0 mL/min/1.73 Final       ASSESSMENT/PLAN:  1) hypothyroidism due to Hashimoto's thyroiditis:  - Patient was clinically euthyroid on exam. However, she reports persistent fatigue. Discussed that her sleep disturbances are likely contributing to her fatigue. However, a trial of adding low dose liothyronine is reasonable. Reviewed with patient that this medication can only be taken daily.    - Continue dose of levothyroxine 150 mcg: Is taking 7 tablets once weekly as directed. Patient had difficulty with taking medication consistently with daily dosing.  Discussed at prior visits that due to the long half-life of levothyroxine, 7 tablets once weekly is possible.  Patient has been able to take medication much more consistently with this dosing.  - Start liothyronine 5 mcg daily. Reviewed potential side effects. Pt to contact me if not toelrating  - Reviewed proper thyroid hormone administration, and factors to avoid that decrease medication potency and medication absorption.     2) abnormal glucose:   H/o gestational diabetes  - A1C% today normal at 5.50%  - CMP was  WNL    Pt to RTC in 6 months    Electronically Signed: Liliane Henry MD

## 2024-07-30 DIAGNOSIS — E06.3 HYPOTHYROIDISM DUE TO HASHIMOTO'S THYROIDITIS: Primary | ICD-10-CM

## 2024-07-30 DIAGNOSIS — E03.8 HYPOTHYROIDISM DUE TO HASHIMOTO'S THYROIDITIS: Primary | ICD-10-CM

## 2024-07-30 RX ORDER — LIOTHYRONINE SODIUM 5 UG/1
5 TABLET ORAL DAILY
Qty: 30 TABLET | Refills: 5 | Status: SHIPPED | OUTPATIENT
Start: 2024-07-30

## 2024-10-08 DIAGNOSIS — E06.3 HYPOTHYROIDISM DUE TO HASHIMOTO'S THYROIDITIS: ICD-10-CM

## 2024-10-08 RX ORDER — LEVOTHYROXINE SODIUM 150 UG/1
150 TABLET ORAL EVERY MORNING
Qty: 90 TABLET | Refills: 1 | Status: SHIPPED | OUTPATIENT
Start: 2024-10-08

## 2025-01-28 ENCOUNTER — OFFICE VISIT (OUTPATIENT)
Dept: ENDOCRINOLOGY | Facility: CLINIC | Age: 35
End: 2025-01-28
Payer: COMMERCIAL

## 2025-01-28 VITALS
HEIGHT: 64 IN | SYSTOLIC BLOOD PRESSURE: 128 MMHG | DIASTOLIC BLOOD PRESSURE: 82 MMHG | WEIGHT: 163 LBS | HEART RATE: 93 BPM | OXYGEN SATURATION: 99 % | BODY MASS INDEX: 27.83 KG/M2

## 2025-01-28 DIAGNOSIS — E06.3 HYPOTHYROIDISM DUE TO HASHIMOTO'S THYROIDITIS: Primary | ICD-10-CM

## 2025-01-28 PROCEDURE — 84443 ASSAY THYROID STIM HORMONE: CPT | Performed by: INTERNAL MEDICINE

## 2025-01-28 PROCEDURE — 99213 OFFICE O/P EST LOW 20 MIN: CPT | Performed by: INTERNAL MEDICINE

## 2025-01-28 NOTE — PROGRESS NOTES
Chief Complaint   Patient presents with    Hypothyroidism     Follow-up       HPI:   Nicole Jose is a 34 y.o.female who [facility] for f/u evaluation of her hypothyroidism. Last visit 07/29/2024. Her history is as follows:    Interim Events:   - Pt took compounded semaglutide x 3 months. Is now off the medication.   - Pt stopped seeing her therapist in 09/2024. Is off lithium.   - Pt reported stopping the liothyronine 5 mcg in 12/2024 due to difficulties taking the medication consistently. Was missing 103 tabs per week. She reports the liothyronine did not cause palpitations    - Patient has been taking her levothyroxine consistently and as directed.   - Pt reports persistent fatigue. Also reports sleep disturbances. Has difficulty falling asleep (despite avoiding LED lighting prior to bedtime) and reports waking up frequently throughout the night    1) hypothyroidism due to Hashimoto's thyroiditis:   Diagnosed at age 20  - reports a h/o of not consistently taking her medication but better compliance now  - no known family h/o thyroid disease  (2024) Had pt try addition of liothyronine to see if she noted any improvement in her fatigue. Pt reported stopping the liothyronine 5 mcg in 12/2024 due to difficulties taking the medication consistently. Was missing 103 tabs per week. She reports the liothyronine did not cause palpitations    Current Dose: levothyroxine 150 mcg  - Is taking 7 tablets once weekly as directed.  Patient had difficulty with taking medication consistently with daily dosing.  Discussed at prior visits that due to the long half-life of levothyroxine, 7 tablets once weekly is possible.  Patient has been able to take medication much more consistently with this dosing.  - is not on high dose biotin    2) h/o gestational diabetes mellitus:  - gestational diabetes with second pregnancy, diet controlled  - was diagnosed with gestational DM at the end of third pregnancy in 04/2019. Was  "treated by her OB with metformin   - Her A1c in June 2022 was 5.90%  - (07/2024) A1c 5.50%    3) difficulty sleeping:   - Pt reports persistent fatigue. Also reports sleep disturbances. Has difficulty falling asleep (despite avoiding LED lighting prior to bedtime) and reports waking up frequently throughout the night    Other History:  - h/o Bipolar 2 disorder diagnosed in 2023. Was Followed by Dr. Ramana Laguerre at [facility]. She started Lithium CR in 12/2023. Stopped seeing provider and stopped lithium in 09/2024.     Review of Systems   Constitutional: Negative.         Wt loss, intentional    HENT: Negative.     Eyes: Negative.    Respiratory: Negative.     Cardiovascular:  Negative for palpitations.   Gastrointestinal: Negative.  Negative for nausea.   Endocrine: Negative.    Genitourinary: Negative.    Musculoskeletal: Negative.    Skin: Negative.    Allergic/Immunologic: Negative.    Neurological: Negative.    Hematological: Negative.    Psychiatric/Behavioral:  Positive for sleep disturbance.         H/o Depression with anxiety     The following portions of the patient's history were reviewed and updated as appropriate: allergies, current medications, past family history, past medical history, past social history, past surgical history and problem list.    /82 (BP Location: Left arm, Patient Position: Sitting, Cuff Size: Adult)   Pulse 93   Ht 161.3 cm (63.5\")   Wt 73.9 kg (163 lb)   SpO2 99%   BMI 28.42 kg/m²   Physical Exam   Constitutional: She is oriented to person, place, and time. She appears well-developed. No distress.   HENT:   Head: Normocephalic.   Eyes: Pupils are equal, round, and reactive to light. Conjunctivae are normal.   Neck: No tracheal deviation present. No thyromegaly present.   No palpable thyroid nodules     Cardiovascular: Normal rate, regular rhythm and normal heart sounds.   No murmur heard.  Pulmonary/Chest: Effort normal and breath sounds normal. No respiratory distress. "   Lymphadenopathy:     She has no cervical adenopathy.   Neurological: She is alert and oriented to person, place, and time. No cranial nerve deficit.   Skin: Skin is warm and dry. She is not diaphoretic. No erythema.   Psychiatric: Her behavior is normal.   Vitals reviewed.    LABS/IMAGING: outside records reviewed and summarized in HPI  Office Visit on 01/28/2025   Component Date Value Ref Range Status    TSH 01/28/2025 3.690  0.270 - 4.200 uIU/mL Final       ASSESSMENT/PLAN:  1) hypothyroidism due to Hashimoto's thyroiditis:  - Patient was clinically euthyroid on exam.   - TSH today was normal at 3.690  - Continue dose of levothyroxine 150 mcg: Is taking 7 tablets once weekly as directed. Patient had difficulty with taking medication consistently with daily dosing.  Discussed at prior visits that due to the long half-life of levothyroxine, 7 tablets once weekly is possible.  Patient has been able to take medication much more consistently with this dosing.  - Reviewed proper thyroid hormone administration, and factors to avoid that decrease medication potency and medication absorption.     Pt to RTC in 6 months    Electronically Signed: Liliane Henry MD

## 2025-01-29 LAB — TSH SERPL DL<=0.05 MIU/L-ACNC: 3.69 UIU/ML (ref 0.27–4.2)

## 2025-02-16 RX ORDER — LEVOTHYROXINE SODIUM 150 UG/1
150 TABLET ORAL EVERY MORNING
Qty: 90 TABLET | Refills: 1 | Status: SHIPPED | OUTPATIENT
Start: 2025-02-16

## 2025-07-01 DIAGNOSIS — E06.3 HYPOTHYROIDISM DUE TO HASHIMOTO'S THYROIDITIS: ICD-10-CM

## 2025-07-01 RX ORDER — LEVOTHYROXINE SODIUM 150 UG/1
150 TABLET ORAL EVERY MORNING
Qty: 90 TABLET | Refills: 0 | Status: SHIPPED | OUTPATIENT
Start: 2025-07-01

## 2025-07-01 NOTE — TELEPHONE ENCOUNTER
Rx Refill Note  Requested Prescriptions     Pending Prescriptions Disp Refills    levothyroxine (SYNTHROID, LEVOTHROID) 150 MCG tablet 90 tablet 1     Sig: Take 1 tablet by mouth Every Morning.      Last office visit with prescribing clinician: 1/28/2025      Next office visit with prescribing clinician: 7/14/2025   {  Reyna Traylor MA  07/01/25, 10:21 EDT

## 2025-08-27 ENCOUNTER — TELEPHONE (OUTPATIENT)
Dept: URGENT CARE | Facility: CLINIC | Age: 35
End: 2025-08-27
Payer: COMMERCIAL
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
